# Patient Record
Sex: FEMALE | Race: WHITE | Employment: FULL TIME | URBAN - METROPOLITAN AREA
[De-identification: names, ages, dates, MRNs, and addresses within clinical notes are randomized per-mention and may not be internally consistent; named-entity substitution may affect disease eponyms.]

---

## 2024-03-11 ENCOUNTER — OCCMED (OUTPATIENT)
Dept: URGENT CARE | Facility: CLINIC | Age: 44
End: 2024-03-11

## 2024-03-11 ENCOUNTER — APPOINTMENT (OUTPATIENT)
Dept: LAB | Facility: CLINIC | Age: 44
End: 2024-03-11

## 2024-03-11 DIAGNOSIS — Z02.1 PHYSICAL EXAM, PRE-EMPLOYMENT: Primary | ICD-10-CM

## 2024-03-11 DIAGNOSIS — Z02.1 PHYSICAL EXAM, PRE-EMPLOYMENT: ICD-10-CM

## 2024-03-11 LAB — RUBV IGG SERPL IA-ACNC: 24.1 IU/ML

## 2024-03-11 PROCEDURE — 86735 MUMPS ANTIBODY: CPT

## 2024-03-11 PROCEDURE — 86762 RUBELLA ANTIBODY: CPT

## 2024-03-11 PROCEDURE — 86765 RUBEOLA ANTIBODY: CPT

## 2024-03-11 PROCEDURE — 86787 VARICELLA-ZOSTER ANTIBODY: CPT

## 2024-03-11 PROCEDURE — 36415 COLL VENOUS BLD VENIPUNCTURE: CPT

## 2024-03-11 PROCEDURE — 86480 TB TEST CELL IMMUN MEASURE: CPT

## 2024-03-12 LAB
GAMMA INTERFERON BACKGROUND BLD IA-ACNC: 0.19 IU/ML
M TB IFN-G BLD-IMP: NEGATIVE
M TB IFN-G CD4+ BCKGRND COR BLD-ACNC: 0.01 IU/ML
M TB IFN-G CD4+ BCKGRND COR BLD-ACNC: 0.13 IU/ML
MEV IGG SER QL IA: NORMAL
MITOGEN IGNF BCKGRD COR BLD-ACNC: 9.81 IU/ML
MUV IGG SER QL IA: NORMAL
VZV IGG SER QL IA: NORMAL

## 2024-03-28 ENCOUNTER — OFFICE VISIT (OUTPATIENT)
Dept: URGENT CARE | Age: 44
End: 2024-03-28

## 2024-03-28 VITALS — RESPIRATION RATE: 18 BRPM | TEMPERATURE: 99.5 F | OXYGEN SATURATION: 99 % | HEART RATE: 85 BPM

## 2024-03-28 DIAGNOSIS — J06.9 ACUTE URI: Primary | ICD-10-CM

## 2024-03-28 PROCEDURE — 99213 OFFICE O/P EST LOW 20 MIN: CPT

## 2024-03-28 RX ORDER — BENZONATATE 200 MG/1
200 CAPSULE ORAL 3 TIMES DAILY PRN
Qty: 20 CAPSULE | Refills: 0 | Status: SHIPPED | OUTPATIENT
Start: 2024-03-28 | End: 2024-03-29

## 2024-03-28 NOTE — PROGRESS NOTES
"  [facility]        NAME: Saloni Malone is a 43 y.o. female  : 1980    MRN: [id number]  DATE: 2024  TIME: 6:35 PM    Assessment and Plan   Acute URI [J06.9]  1. Acute URI  benzonatate (TESSALON) 200 MG capsule        Presents with significant other. Cough, congestion, rhinorrhea. Eating/drinking WNL. Playing on phone during visit in NAD. BS clear. Has \" tried everything\".     Patient Instructions       Follow up with PCP as needed    If tests have been performed at [facility], our office will contact you with results if changes need to be made to the care plan discussed with you at the visit.  [facility]    Chief Complaint     Chief Complaint   Patient presents with    Earache     Cough starting 3 days ago. Some pain in right ear         History of Present Illness       Presents with significant other. Cough, congestion, rhinorrhea. Eating/drinking WNL. Playing on phone during visit in NAD. BS clear. Has \" tried everything\".     Earache   Associated symptoms include coughing and rhinorrhea.       Review of Systems   Review of Systems   Constitutional:  Negative for fever.   HENT:  Positive for congestion, ear pain and rhinorrhea.    Respiratory:  Positive for cough. Negative for shortness of breath and wheezing.    All other systems reviewed and are negative.        Current Medications       Current Outpatient Medications:     benzonatate (TESSALON) 200 MG capsule, Take 1 capsule (200 mg total) by mouth 3 (three) times a day as needed for cough, Disp: 20 capsule, Rfl: 0    Current Allergies     Allergies as of 2024    (No Known Allergies)            The following portions of the patient's history were reviewed and updated as appropriate: allergies, current medications, past family history, past medical history, past social history, past surgical history and problem list.     No past medical history on file.    No past surgical history " on file.    No family history on file.      Medications have been verified.        Objective   Pulse 85   Temp 99.5 °F (37.5 °C)   Resp 18   SpO2 99%   No LMP recorded.       Physical Exam     Physical Exam  Vitals reviewed.   Constitutional:       Appearance: Normal appearance.   HENT:      Right Ear: Tympanic membrane normal.      Left Ear: Tympanic membrane normal.      Nose: Congestion and rhinorrhea present.   Cardiovascular:      Rate and Rhythm: Normal rate and regular rhythm.      Pulses: Normal pulses.      Heart sounds: Normal heart sounds.   Pulmonary:      Effort: Pulmonary effort is normal.      Breath sounds: Normal breath sounds.   Musculoskeletal:         General: Normal range of motion.   Lymphadenopathy:      Cervical: No cervical adenopathy.   Skin:     General: Skin is warm and dry.   Neurological:      Mental Status: She is alert.

## 2024-03-29 DIAGNOSIS — R05.1 ACUTE COUGH: Primary | ICD-10-CM

## 2024-03-29 RX ORDER — BENZONATATE 200 MG/1
200 CAPSULE ORAL 3 TIMES DAILY PRN
Qty: 20 CAPSULE | Refills: 0 | Status: SHIPPED | OUTPATIENT
Start: 2024-03-29

## 2024-04-03 ENCOUNTER — HOSPITAL ENCOUNTER (EMERGENCY)
Facility: HOSPITAL | Age: 44
Discharge: HOME | End: 2024-04-03
Attending: EMERGENCY MEDICINE
Payer: COMMERCIAL

## 2024-04-03 VITALS
OXYGEN SATURATION: 98 % | HEART RATE: 105 BPM | RESPIRATION RATE: 16 BRPM | WEIGHT: 194 LBS | SYSTOLIC BLOOD PRESSURE: 143 MMHG | TEMPERATURE: 98.5 F | DIASTOLIC BLOOD PRESSURE: 94 MMHG

## 2024-04-03 DIAGNOSIS — J02.9 PHARYNGITIS: Primary | ICD-10-CM

## 2024-04-03 LAB
FLUAV RNA RESP QL NAA+PROBE: NEGATIVE
FLUBV RNA RESP QL NAA+PROBE: NEGATIVE
RSV RNA RESP QL NAA+PROBE: NEGATIVE
S PYO DNA THROAT QL NAA+PROBE: NOT DETECTED
SARS-COV-2 RNA RESP QL NAA+PROBE: NEGATIVE

## 2024-04-03 PROCEDURE — 87651 STREP A DNA AMP PROBE: CPT | Performed by: EMERGENCY MEDICINE

## 2024-04-03 PROCEDURE — 99284 EMERGENCY DEPT VISIT MOD MDM: CPT | Performed by: EMERGENCY MEDICINE

## 2024-04-03 PROCEDURE — 99283 EMERGENCY DEPT VISIT LOW MDM: CPT

## 2024-04-03 PROCEDURE — 0241U HB NFCT DS VIR RESP RNA 4 TRGT: CPT | Performed by: EMERGENCY MEDICINE

## 2024-04-03 RX ORDER — BENZOCAINE AND MENTHOL 15; 3.6 MG/1; MG/1
1 LOZENGE ORAL EVERY 2 HOUR PRN
Qty: 32 LOZENGE | Refills: 0 | Status: SHIPPED | OUTPATIENT
Start: 2024-04-03

## 2024-04-03 RX ORDER — ALBUTEROL SULFATE 90 UG/1
2 AEROSOL, METERED RESPIRATORY (INHALATION) ONCE
Status: COMPLETED | OUTPATIENT
Start: 2024-04-03 | End: 2024-04-03

## 2024-04-03 RX ORDER — AMOXICILLIN AND CLAVULANATE POTASSIUM 875; 125 MG/1; MG/1
1 TABLET, FILM COATED ORAL EVERY 12 HOURS
Qty: 14 TABLET | Refills: 0 | Status: SHIPPED | OUTPATIENT
Start: 2024-04-03 | End: 2024-04-10

## 2024-04-03 RX ADMIN — DEXAMETHASONE SODIUM PHOSPHATE 10 MG: 10 INJECTION, SOLUTION INTRAMUSCULAR; INTRAVENOUS at 12:18

## 2024-04-03 RX ADMIN — ALBUTEROL SULFATE 2 PUFF: 90 AEROSOL, METERED RESPIRATORY (INHALATION) at 12:19

## 2024-04-03 NOTE — Clinical Note
Saloni Malone was seen and treated in our emergency department on 4/3/2024.                Diagnosis:     Saloni  may return to work on return date.    She may return on this date: 04/05/2024         If you have any questions or concerns, please don't hesitate to call.      Lopez Rios MD    ______________________________           _______________          _______________  Hospital Representative                              Date                                Time

## 2024-04-12 NOTE — ED PROVIDER NOTES
History  Chief Complaint   Patient presents with    Flu Symptoms     Pt reports sore throat, cough, congestion with decrease in appetite due to throat hurting. Seen at urgent care and given medicine for cough with no relief     Saloni is a very pleasant 43-year-old female here for evaluation of sore throat, nasal congestion, decreased appetite and general malaise for the past 5 days or so.  She was seen at urgent care on 3/28/2024, diagnosed with viral URI and prescribed Tessalon Perles.  He said provided her minimal relief.  Her biggest complaint today is sore throat and painful swallowing.  She is able to tolerate liquids p.o. but swallowing is painful.  No difficulty handling her own secretions.      Flu Symptoms  Presenting symptoms: fatigue, myalgias, rhinorrhea and sore throat    Presenting symptoms: no cough, no fever, no nausea, no shortness of breath and no vomiting    Associated symptoms: nasal congestion    Associated symptoms: no chills and no ear pain        Prior to Admission Medications   Prescriptions Last Dose Informant Patient Reported? Taking?   benzonatate (TESSALON) 200 MG capsule   No No   Sig: Take 1 capsule (200 mg total) by mouth 3 (three) times a day as needed for cough      Facility-Administered Medications: None       History reviewed. No pertinent past medical history.    History reviewed. No pertinent surgical history.    History reviewed. No pertinent family history.  I have reviewed and agree with the history as documented.    E-Cigarette/Vaping    E-Cigarette Use Never User      E-Cigarette/Vaping Substances    Nicotine No     THC No     CBD No     Flavoring No     Other No     Unknown No      Social History     Tobacco Use    Smoking status: Never    Smokeless tobacco: Never   Vaping Use    Vaping status: Never Used   Substance Use Topics    Alcohol use: Not Currently    Drug use: Not Currently       Review of Systems   Constitutional:  Positive for fatigue. Negative for chills and  fever.   HENT:  Positive for congestion, rhinorrhea and sore throat. Negative for ear pain.    Eyes:  Negative for visual disturbance.   Respiratory:  Negative for cough and shortness of breath.    Cardiovascular:  Negative for chest pain and palpitations.   Gastrointestinal:  Negative for abdominal pain, nausea and vomiting.   Genitourinary:  Negative for dysuria and hematuria.   Musculoskeletal:  Positive for myalgias. Negative for arthralgias and back pain.   Skin:  Negative for color change and rash.   Neurological:  Negative for syncope.   All other systems reviewed and are negative.      Physical Exam  Physical Exam  Vitals and nursing note reviewed.   Constitutional:       General: She is not in acute distress.     Appearance: She is well-developed.   HENT:      Head: Normocephalic and atraumatic.      Mouth/Throat:      Mouth: Mucous membranes are moist.      Pharynx: Posterior oropharyngeal erythema present. No oropharyngeal exudate.      Comments: Pharyngeal erythema without exudates.  Uvula is midline.  No significant trismus.  No clinical evidence of PTA or RPA.  Sublingual area is unremarkable.  Eyes:      Conjunctiva/sclera: Conjunctivae normal.   Cardiovascular:      Rate and Rhythm: Normal rate and regular rhythm.      Heart sounds: No murmur heard.  Pulmonary:      Effort: Pulmonary effort is normal. No respiratory distress.      Breath sounds: Normal breath sounds.   Abdominal:      Palpations: Abdomen is soft.      Tenderness: There is no abdominal tenderness.   Musculoskeletal:         General: No swelling.      Cervical back: Neck supple.   Skin:     General: Skin is warm and dry.      Capillary Refill: Capillary refill takes less than 2 seconds.      Findings: No rash.   Neurological:      General: No focal deficit present.      Mental Status: She is alert and oriented to person, place, and time.   Psychiatric:         Mood and Affect: Mood normal.         Vital Signs  ED Triage Vitals  [04/03/24 1153]   Temperature Pulse Respirations Blood Pressure SpO2   98.5 °F (36.9 °C) 105 16 143/94 98 %      Temp Source Heart Rate Source Patient Position - Orthostatic VS BP Location FiO2 (%)   Oral Monitor Sitting Right arm --      Pain Score       7           Vitals:    04/03/24 1153   BP: 143/94   Pulse: 105   Patient Position - Orthostatic VS: Sitting         Visual Acuity      ED Medications  Medications   dexamethasone oral liquid 10 mg 1 mL (10 mg Oral Given 4/3/24 1218)   albuterol (PROVENTIL HFA,VENTOLIN HFA) inhaler 2 puff (2 puffs Inhalation Given 4/3/24 1219)       Diagnostic Studies  Results Reviewed       Procedure Component Value Units Date/Time    FLU/RSV/COVID - if FLU/RSV clinically relevant [[id number]]  (Normal) Collected: 04/03/24 1217    Lab Status: Final result Specimen: Nares from Nose Updated: 04/03/24 1329     SARS-CoV-2 Negative     INFLUENZA A PCR Negative     INFLUENZA B PCR Negative     RSV PCR Negative    Narrative:      FOR PEDIATRIC PATIENTS - copy/paste COVID Guidelines URL to browser: [email or web address]    SARS-CoV-2 assay is a Nucleic Acid Amplification assay intended for the  qualitative detection of nucleic acid from SARS-CoV-2 in nasopharyngeal  swabs. Results are for the presumptive identification of SARS-CoV-2 RNA.    Positive results are indicative of infection with SARS-CoV-2, the virus  causing COVID-19, but do not rule out bacterial infection or co-infection  with other viruses. Laboratories within the [place] and its  territories are required to report all positive results to the appropriate  public health authorities. Negative results do not preclude SARS-CoV-2  infection and should not be used as the sole basis for treatment or other  patient management decisions. Negative results must be combined with  clinical observations, patient history, and epidemiological information.  This test has not been FDA  cleared or approved.    This test has been authorized by FDA under an Emergency Use Authorization  (EUA). This test is only authorized for the duration of time the  declaration that circumstances exist justifying the authorization of the  emergency use of an in vitro diagnostic tests for detection of SARS-CoV-2  virus and/or diagnosis of COVID-19 infection under section 564(b)(1) of  the Act, 21 U.S.C. 360bbb-3(b)(1), unless the authorization is terminated  or revoked sooner. The test has been validated but independent review by FDA  and CLIA is pending.    Test performed using [employer] GeneXpert: This RT-PCR assay targets N2,  a region unique to SARS-CoV-2. A conserved region in the E-gene was chosen  for pan-Sarbecovirus detection which includes SARS-CoV-2.    According to CMS-2020-01-R, this platform meets the definition of high-throughput technology.    Strep A PCR [[id number]]  (Normal) Collected: 04/03/24 1217    Lab Status: Final result Specimen: Throat Updated: 04/03/24 1315     STREP A PCR Not Detected                   No orders to display              Procedures  Procedures         ED Course                               SBIRT 20yo+      Flowsheet Row Most Recent Value   Initial Alcohol Screen: US AUDIT-C     1. How often do you have a drink containing alcohol? 0 Filed at: 04/03/2024 1212   2. How many drinks containing alcohol do you have on a typical day you are drinking?  0 Filed at: 04/03/2024 1212   3b. FEMALE Any Age, or MALE 65+: How often do you have 4 or more drinks on one occassion? 0 Filed at: 04/03/2024 1212   Audit-C Score 0 Filed at: 04/03/2024 1212   ASPEN: How many times in the past year have you...    Used an illegal drug or used a prescription medication for non-medical reasons? Never Filed at: 04/03/2024 1212                      Medical Decision Making  Amount and/or Complexity of Data Reviewed  Labs: ordered. Decision-making details documented in ED Course.    Risk  OTC  drugs.  Prescription drug management.             Disposition  Final diagnoses:   Pharyngitis     Time reflects when diagnosis was documented in both MDM as applicable and the Disposition within this note       Time User Action Codes Description Comment    4/3/2024 12:13 PM Lopez Rios Add [J02.9] Pharyngitis           ED Disposition       ED Disposition   Discharge    Condition   Stable    Date/Time   Wed Apr 3, 2024 1212    Comment   Saloni Malone discharge to home/self care.                   Follow-up Information       Follow up With Specialties Details Why Contact [facility] 2nd Floor Family Medicine   [address] [facility] 2ND FLOOR  [facility] [address]  [phone]              Discharge Medication List as of 4/3/2024 12:15 PM        START taking these medications    Details   amoxicillin-clavulanate (AUGMENTIN) 875-125 mg per tablet Take 1 tablet by mouth every 12 (twelve) hours for 7 days, Starting Wed 4/3/2024, Until Wed 4/10/2024, Normal      benzocaine-menthol (Cepacol Sore Throat Ex St) 15-3.6 mg per lozenge Apply 1 lozenge to the mouth or throat every 2 (two) hours as needed (sore throat), Starting Wed 4/3/2024, Normal           CONTINUE these medications which have NOT CHANGED    Details   benzonatate (TESSALON) 200 MG capsule Take 1 capsule (200 mg total) by mouth 3 (three) times a day as needed for cough, Starting Fri 3/29/2024, Print             No discharge procedures on file.    PDMP Review       None            ED Provider  Electronically Signed by             Lopez Rios MD  04/12/24 1435

## 2024-05-15 ENCOUNTER — OFFICE VISIT (OUTPATIENT)
Dept: FAMILY MEDICINE CLINIC | Facility: CLINIC | Age: 44
End: 2024-05-15
Payer: COMMERCIAL

## 2024-05-15 ENCOUNTER — HOSPITAL ENCOUNTER (OUTPATIENT)
Dept: RADIOLOGY | Facility: HOSPITAL | Age: 44
Discharge: HOME | End: 2024-05-15
Payer: COMMERCIAL

## 2024-05-15 VITALS
SYSTOLIC BLOOD PRESSURE: 104 MMHG | DIASTOLIC BLOOD PRESSURE: 80 MMHG | HEIGHT: 65 IN | WEIGHT: 189 LBS | BODY MASS INDEX: 31.49 KG/M2 | HEART RATE: 84 BPM

## 2024-05-15 DIAGNOSIS — Z12.31 ENCOUNTER FOR SCREENING MAMMOGRAM FOR MALIGNANT NEOPLASM OF BREAST: ICD-10-CM

## 2024-05-15 DIAGNOSIS — Z13.220 LIPID SCREENING: ICD-10-CM

## 2024-05-15 DIAGNOSIS — Z11.59 NEED FOR HEPATITIS C SCREENING TEST: ICD-10-CM

## 2024-05-15 DIAGNOSIS — Z11.4 SCREENING FOR HIV (HUMAN IMMUNODEFICIENCY VIRUS): ICD-10-CM

## 2024-05-15 DIAGNOSIS — Z00.00 HEALTHCARE MAINTENANCE: ICD-10-CM

## 2024-05-15 DIAGNOSIS — R05.3 CHRONIC COUGH: ICD-10-CM

## 2024-05-15 DIAGNOSIS — Z12.11 SCREEN FOR COLON CANCER: ICD-10-CM

## 2024-05-15 DIAGNOSIS — R63.5 WEIGHT GAIN: Primary | ICD-10-CM

## 2024-05-15 PROBLEM — K76.0 HEPATIC STEATOSIS: Status: ACTIVE | Noted: 2019-07-15

## 2024-05-15 PROBLEM — Z86.018 HISTORY OF UTERINE FIBROID: Status: ACTIVE | Noted: 2019-07-15

## 2024-05-15 PROCEDURE — 99204 OFFICE O/P NEW MOD 45 MIN: CPT | Performed by: PHYSICIAN ASSISTANT

## 2024-05-15 PROCEDURE — 71046 X-RAY EXAM CHEST 2 VIEWS: CPT

## 2024-05-15 RX ORDER — PREDNISONE 10 MG/1
TABLET ORAL
Qty: 25 TABLET | Refills: 0 | Status: SHIPPED | OUTPATIENT
Start: 2024-05-15

## 2024-05-15 NOTE — PROGRESS NOTES
Ambulatory Visit  Name: Saloni Malone      : 1980      MRN: [id number]  Encounter Provider: Sylwia Cates PA-C  Encounter Date: 5/15/2024   Encounter department: [facility]    Assessment & Plan   1. Weight gain  Assessment & Plan:  Check fasting labs including TSH.  Orders:  -     CBC and differential  -     Comprehensive metabolic panel  -     TSH, 3rd generation with Free T4 reflex  2. Healthcare maintenance  -     Ambulatory Referral to Obstetrics / Gynecology; Future  3. Lipid screening  -     Lipid panel  4. Chronic cough  Assessment & Plan:  I do believe patient is dealing with severe postinfectious cough.  Her work entails her to talk on the phone a lot and she is having trouble continuing her job in a professional manner.  Check chest x-ray.  Trial prednisone taper.Denies heartburn or history of asthma or allergies but will need to look into these things if symptoms do not subside with this treatment plan.  Orders:  -     XR chest pa & lateral; Future; Expected date: 05/15/2024  -     predniSONE 10 mg tablet; Day 1= 50 mg at once, date 2+3= 40 mg once daily, day 4+5= 30 mg once daily, day 6+7= 20 mg once daily, and day 8+9= 10 mg once daily.  5. Encounter for screening mammogram for malignant neoplasm of breast  -     Mammo screening bilateral w 3d & cad; Future  6. Screen for colon cancer  -     Ambulatory Referral to Gastroenterology; Future  7. Need for hepatitis C screening test  -     Hepatitis C Antibody; Future  8. Screening for HIV (human immunodeficiency virus)  -     HIV 1/2 AG/AB w Reflex [facility] for 2 yr old and above; Future      Depression Screening and Follow-up Plan: Patient was screened for depression during today's encounter. They screened negative with a PHQ-2 score of 1.      History of Present Illness     Patient presents with: [employer] employee insurance.  Establish Care  Cough: Has a cough since March since was sick end of March. NO seasonal  "allergies. This has happened about 3 years ago and was told to take allergy med and it worked but not this time. Is better but still having episodes of coughing. NO heartburn. At times productive but does not look at color.   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  He lost insurance and their family is in need of routine care follow-up dental appointments.  She has one 16-year-old who is graduating high school from [employer] and also twin [id number],  girl and boy.        Review of Systems   Constitutional: Negative.    HENT: Negative.     Eyes: Negative.    Respiratory: Negative.     Cardiovascular: Negative.    Gastrointestinal: Negative.    Endocrine: Negative.    Genitourinary: Negative.    Musculoskeletal: Negative.    Skin: Negative.    Allergic/Immunologic: Negative.    Neurological: Negative.    Hematological: Negative.    Psychiatric/Behavioral: Negative.         Objective     /80   Pulse 84   Ht 5' 4.5\" (1.638 m)   Wt 85.7 kg (189 lb)   BMI 31.94 kg/m²     Physical Exam  Vitals and nursing note reviewed.   Constitutional:       Appearance: Normal appearance. She is well-developed.   HENT:      Head: Normocephalic and atraumatic.   Eyes:      General: Lids are normal.      Conjunctiva/sclera: Conjunctivae normal.      Pupils: Pupils are equal, round, and reactive to light.   Cardiovascular:      Rate and Rhythm: Normal rate and regular rhythm.      Heart sounds: No murmur heard.  Pulmonary:      Effort: Pulmonary effort is normal.      Breath sounds: Normal breath sounds.   Skin:     General: Skin is warm and dry.   Neurological:      General: No focal deficit present.      Mental Status: She is alert.      Coordination: Coordination is intact.   Psychiatric:         Mood and Affect: Mood normal.         Behavior: Behavior normal. Behavior is cooperative.         Thought Content: Thought content " normal.         Judgment: Judgment normal.       Administrative Statements

## 2024-05-15 NOTE — PATIENT INSTRUCTIONS
1. Weight gain  Assessment & Plan:  Check fasting labs including TSH.  Orders:  -     CBC and differential  -     Comprehensive metabolic panel  -     TSH, 3rd generation with Free T4 reflex  2. Healthcare maintenance  -     Ambulatory Referral to Obstetrics / Gynecology; Future  3. Lipid screening  -     Lipid panel  4. Chronic cough  Assessment & Plan:  I do believe patient is dealing with severe postinfectious cough.  Her work entails her to talk on the phone a lot and she is having trouble continuing her job in a professional manner.  Check chest x-ray.  Trial prednisone taper.Denies heartburn or history of asthma or allergies but will need to look into these things if symptoms do not subside with this treatment plan.  Orders:  -     XR chest pa & lateral; Future; Expected date: 05/15/2024  -     predniSONE 10 mg tablet; Day 1= 50 mg at once, date 2+3= 40 mg once daily, day 4+5= 30 mg once daily, day 6+7= 20 mg once daily, and day 8+9= 10 mg once daily.  5. Encounter for screening mammogram for malignant neoplasm of breast  -     Mammo screening bilateral w 3d & cad; Future  6. Screen for colon cancer  -     Ambulatory Referral to Gastroenterology; Future  7. Need for hepatitis C screening test  -     Hepatitis C Antibody; Future  8. Screening for HIV (human immunodeficiency virus)  -     HIV 1/2 AG/AB w Reflex [facility] for 2 yr old and above; Future

## 2024-05-15 NOTE — ASSESSMENT & PLAN NOTE
I do believe patient is dealing with severe postinfectious cough.  Her work entails her to talk on the phone a lot and she is having trouble continuing her job in a professional manner.  Check chest x-ray.  Trial prednisone taper.Denies heartburn or history of asthma or allergies but will need to look into these things if symptoms do not subside with this treatment plan.

## 2024-05-16 ENCOUNTER — TELEPHONE (OUTPATIENT)
Age: 44
End: 2024-05-16

## 2024-05-16 ENCOUNTER — LAB (OUTPATIENT)
Dept: LAB | Facility: CLINIC | Age: 44
End: 2024-05-16
Payer: COMMERCIAL

## 2024-05-16 DIAGNOSIS — R73.9 HYPERGLYCEMIA: ICD-10-CM

## 2024-05-16 DIAGNOSIS — Z11.4 SCREENING FOR HIV (HUMAN IMMUNODEFICIENCY VIRUS): ICD-10-CM

## 2024-05-16 DIAGNOSIS — Z11.59 NEED FOR HEPATITIS C SCREENING TEST: ICD-10-CM

## 2024-05-16 DIAGNOSIS — R73.9 HYPERGLYCEMIA: Primary | ICD-10-CM

## 2024-05-16 LAB
ALBUMIN SERPL BCP-MCNC: 4.2 G/DL (ref 3.5–5)
ALP SERPL-CCNC: 93 U/L (ref 34–104)
ALT SERPL W P-5'-P-CCNC: 37 U/L (ref 7–52)
ANION GAP SERPL CALCULATED.3IONS-SCNC: 9 MMOL/L (ref 4–13)
AST SERPL W P-5'-P-CCNC: 20 U/L (ref 13–39)
BASOPHILS # BLD AUTO: 0.01 THOUSANDS/ÂΜL (ref 0–0.1)
BASOPHILS NFR BLD AUTO: 0 % (ref 0–1)
BILIRUB SERPL-MCNC: 0.58 MG/DL (ref 0.2–1)
BUN SERPL-MCNC: 16 MG/DL (ref 5–25)
CALCIUM SERPL-MCNC: 9.2 MG/DL (ref 8.4–10.2)
CHLORIDE SERPL-SCNC: 101 MMOL/L (ref 96–108)
CHOLEST SERPL-MCNC: 216 MG/DL
CO2 SERPL-SCNC: 24 MMOL/L (ref 21–32)
CREAT SERPL-MCNC: 0.57 MG/DL (ref 0.6–1.3)
EOSINOPHIL # BLD AUTO: 0 THOUSAND/ÂΜL (ref 0–0.61)
EOSINOPHIL NFR BLD AUTO: 0 % (ref 0–6)
ERYTHROCYTE [DISTWIDTH] IN BLOOD BY AUTOMATED COUNT: 12.4 % (ref 11.6–15.1)
GFR SERPL CREATININE-BSD FRML MDRD: 112 ML/MIN/1.73SQ M
GLUCOSE P FAST SERPL-MCNC: 259 MG/DL (ref 65–99)
HCT VFR BLD AUTO: 40 % (ref 34.8–46.1)
HCV AB SER QL: NORMAL
HDLC SERPL-MCNC: 54 MG/DL
HGB BLD-MCNC: 13.2 G/DL (ref 11.5–15.4)
HIV 1+2 AB+HIV1 P24 AG SERPL QL IA: NORMAL
HIV 2 AB SERPL QL IA: NORMAL
HIV1 AB SERPL QL IA: NORMAL
HIV1 P24 AG SERPL QL IA: NORMAL
IMM GRANULOCYTES # BLD AUTO: 0.04 THOUSAND/UL (ref 0–0.2)
IMM GRANULOCYTES NFR BLD AUTO: 0 % (ref 0–2)
LDLC SERPL CALC-MCNC: 142 MG/DL (ref 0–100)
LYMPHOCYTES # BLD AUTO: 1.93 THOUSANDS/ÂΜL (ref 0.6–4.47)
LYMPHOCYTES NFR BLD AUTO: 15 % (ref 14–44)
MCH RBC QN AUTO: 28.4 PG (ref 26.8–34.3)
MCHC RBC AUTO-ENTMCNC: 33 G/DL (ref 31.4–37.4)
MCV RBC AUTO: 86 FL (ref 82–98)
MONOCYTES # BLD AUTO: 0.46 THOUSAND/ÂΜL (ref 0.17–1.22)
MONOCYTES NFR BLD AUTO: 4 % (ref 4–12)
NEUTROPHILS # BLD AUTO: 10.76 THOUSANDS/ÂΜL (ref 1.85–7.62)
NEUTS SEG NFR BLD AUTO: 81 % (ref 43–75)
NONHDLC SERPL-MCNC: 162 MG/DL
NRBC BLD AUTO-RTO: 0 /100 WBCS
PLATELET # BLD AUTO: 270 THOUSANDS/UL (ref 149–390)
PMV BLD AUTO: 12.2 FL (ref 8.9–12.7)
POTASSIUM SERPL-SCNC: 4.2 MMOL/L (ref 3.5–5.3)
PROT SERPL-MCNC: 8 G/DL (ref 6.4–8.4)
RBC # BLD AUTO: 4.65 MILLION/UL (ref 3.81–5.12)
SODIUM SERPL-SCNC: 134 MMOL/L (ref 135–147)
TRIGL SERPL-MCNC: 99 MG/DL
TSH SERPL DL<=0.05 MIU/L-ACNC: 0.86 UIU/ML (ref 0.45–4.5)
WBC # BLD AUTO: 13.2 THOUSAND/UL (ref 4.31–10.16)

## 2024-05-16 PROCEDURE — 80061 LIPID PANEL: CPT | Performed by: PHYSICIAN ASSISTANT

## 2024-05-16 PROCEDURE — 85025 COMPLETE CBC W/AUTO DIFF WBC: CPT | Performed by: PHYSICIAN ASSISTANT

## 2024-05-16 PROCEDURE — 36415 COLL VENOUS BLD VENIPUNCTURE: CPT

## 2024-05-16 PROCEDURE — 83036 HEMOGLOBIN GLYCOSYLATED A1C: CPT

## 2024-05-16 PROCEDURE — 84443 ASSAY THYROID STIM HORMONE: CPT | Performed by: PHYSICIAN ASSISTANT

## 2024-05-16 PROCEDURE — 87389 HIV-1 AG W/HIV-1&-2 AB AG IA: CPT

## 2024-05-16 PROCEDURE — 86803 HEPATITIS C AB TEST: CPT

## 2024-05-16 PROCEDURE — 80053 COMPREHEN METABOLIC PANEL: CPT | Performed by: PHYSICIAN ASSISTANT

## 2024-05-16 NOTE — RESULT ENCOUNTER NOTE
Can we please call lab and see if they can add on an A1C due to fasting glucose of 259? Order was placed. Thank you.

## 2024-05-16 NOTE — TELEPHONE ENCOUNTER
Patient called asking to review her lab results from today. Please have provider review results and f/u with patient.    Patient said that you may leave a detailed voice mail if she doesn't answer

## 2024-05-16 NOTE — TELEPHONE ENCOUNTER
Left detailed message, results are not reviewed yet since Sylwia ordered additional blood work to her labs due to elevated fasting glucose.  We will call patient as soon as we get all results back.

## 2024-05-17 ENCOUNTER — TELEPHONE (OUTPATIENT)
Dept: FAMILY MEDICINE CLINIC | Facility: CLINIC | Age: 44
End: 2024-05-17

## 2024-05-17 LAB
EST. AVERAGE GLUCOSE BLD GHB EST-MCNC: 206 MG/DL
HBA1C MFR BLD: 8.8 %

## 2024-05-17 NOTE — RESULT ENCOUNTER NOTE
Please let pt know that her labs show she is diabetic with a fasting glucose of  259 and an A1C of 8.8. I would like her to se richardp an apt with me to review this new diagnosis and discuss plan for management.   Chest xray was normal.   Thank you.

## 2024-05-17 NOTE — TELEPHONE ENCOUNTER
Maribel Cates;    Please let pt know that her labs show she is diabetic with a fasting glucose of  259 and an A1C of 8.8. I would like her to se richardp an apt with me to review this new diagnosis and discuss plan for management.  Chest xray was normal.  Thank you.

## 2024-05-20 NOTE — TELEPHONE ENCOUNTER
Patient aware of labs. Scheduled for next Tuesday 5/28. Asking what she should do or change in the meantime.

## 2024-05-20 NOTE — TELEPHONE ENCOUNTER
The only thing she can do is to stay away from sweets, candy desserts and overeating simple carbs.

## 2024-05-22 ENCOUNTER — OFFICE VISIT (OUTPATIENT)
Dept: FAMILY MEDICINE CLINIC | Facility: CLINIC | Age: 44
End: 2024-05-22
Payer: COMMERCIAL

## 2024-05-22 ENCOUNTER — TELEPHONE (OUTPATIENT)
Age: 44
End: 2024-05-22

## 2024-05-22 VITALS
DIASTOLIC BLOOD PRESSURE: 82 MMHG | BODY MASS INDEX: 30.32 KG/M2 | SYSTOLIC BLOOD PRESSURE: 120 MMHG | HEIGHT: 65 IN | OXYGEN SATURATION: 98 % | HEART RATE: 66 BPM | WEIGHT: 182 LBS

## 2024-05-22 DIAGNOSIS — E11.65 TYPE 2 DIABETES MELLITUS WITH HYPERGLYCEMIA, WITHOUT LONG-TERM CURRENT USE OF INSULIN (HCC): Primary | ICD-10-CM

## 2024-05-22 DIAGNOSIS — R42 DIZZINESS: ICD-10-CM

## 2024-05-22 LAB
CREAT UR-MCNC: 41.4 MG/DL
LEFT EYE DIABETIC RETINOPATHY: NORMAL
LEFT EYE IMAGE QUALITY: NORMAL
LEFT EYE MACULAR EDEMA: NORMAL
LEFT EYE OTHER RETINOPATHY: NORMAL
MICROALBUMIN UR-MCNC: <7 MG/L
MICROALBUMIN/CREAT 24H UR: <17 MG/G CREATININE (ref 0–30)
RIGHT EYE DIABETIC RETINOPATHY: NORMAL
RIGHT EYE IMAGE QUALITY: NORMAL
RIGHT EYE MACULAR EDEMA: NORMAL
RIGHT EYE OTHER RETINOPATHY: NORMAL
SEVERITY (EYE EXAM): NORMAL
SL AMB  POCT GLUCOSE, UA: NORMAL
SL AMB LEUKOCYTE ESTERASE,UA: NEGATIVE
SL AMB POCT BILIRUBIN,UA: NEGATIVE
SL AMB POCT BLOOD,UA: NORMAL
SL AMB POCT CLARITY,UA: CLEAR
SL AMB POCT COLOR,UA: YELLOW
SL AMB POCT GLUCOSE BLD: 216
SL AMB POCT KETONES,UA: NEGATIVE
SL AMB POCT NITRITE,UA: POSITIVE
SL AMB POCT PH,UA: 5.5
SL AMB POCT SPECIFIC GRAVITY,UA: 1.01
SL AMB POCT URINE PROTEIN: NEGATIVE
SL AMB POCT UROBILINOGEN: 0.2

## 2024-05-22 PROCEDURE — 82948 REAGENT STRIP/BLOOD GLUCOSE: CPT | Performed by: NURSE PRACTITIONER

## 2024-05-22 PROCEDURE — 82043 UR ALBUMIN QUANTITATIVE: CPT | Performed by: NURSE PRACTITIONER

## 2024-05-22 PROCEDURE — 92250 FUNDUS PHOTOGRAPHY W/I&R: CPT | Performed by: NURSE PRACTITIONER

## 2024-05-22 PROCEDURE — 82570 ASSAY OF URINE CREATININE: CPT | Performed by: NURSE PRACTITIONER

## 2024-05-22 PROCEDURE — 81002 URINALYSIS NONAUTO W/O SCOPE: CPT | Performed by: NURSE PRACTITIONER

## 2024-05-22 PROCEDURE — 99214 OFFICE O/P EST MOD 30 MIN: CPT | Performed by: NURSE PRACTITIONER

## 2024-05-22 RX ORDER — BLOOD SUGAR DIAGNOSTIC
STRIP MISCELLANEOUS
Qty: 100 EACH | Refills: 3 | Status: SHIPPED | OUTPATIENT
Start: 2024-05-22

## 2024-05-22 RX ORDER — MECLIZINE HYDROCHLORIDE 25 MG/1
25 TABLET ORAL 3 TIMES DAILY PRN
Qty: 30 TABLET | Refills: 0 | Status: SHIPPED | OUTPATIENT
Start: 2024-05-22

## 2024-05-22 RX ORDER — METFORMIN HYDROCHLORIDE 500 MG/1
500 TABLET, EXTENDED RELEASE ORAL
Qty: 90 TABLET | Refills: 3 | Status: SHIPPED | OUTPATIENT
Start: 2024-05-22

## 2024-05-22 RX ORDER — ATORVASTATIN CALCIUM 20 MG/1
20 TABLET, FILM COATED ORAL EVERY EVENING
Qty: 100 TABLET | Refills: 1 | Status: SHIPPED | OUTPATIENT
Start: 2024-05-22

## 2024-05-22 RX ORDER — LANCETS 33 GAUGE
EACH MISCELLANEOUS
Qty: 100 EACH | Refills: 3 | Status: SHIPPED | OUTPATIENT
Start: 2024-05-22

## 2024-05-22 RX ORDER — BLOOD-GLUCOSE METER
KIT MISCELLANEOUS
Qty: 1 KIT | Refills: 0 | Status: SHIPPED | OUTPATIENT
Start: 2024-05-22

## 2024-05-22 NOTE — PROGRESS NOTES
Ambulatory Visit  Name: Saloni Malone      : 1980      MRN: [id number]  Encounter Provider: EDVIN Stroud  Encounter Date: 2024   Encounter department: [facility]    Assessment & Plan   1. Type 2 diabetes mellitus with hyperglycemia, without long-term current use of insulin ([facility])  Assessment & Plan:  - Pt poc glucose in office 216   - Will start patient on metformin 500mg q24  - Referral made to nutritionist  - Educated patient on glucometer and to use at least 1x a day   - Labs ordered for August   - Started on 20mg of atorvastatin- currently    -       Lab Results   Component Value Date    HGBA1C 8.8 (H) 2024     Orders:  -     IRIS Diabetic eye exam  -     Albumin / creatinine urine ratio; Future  -     POCT urine dip  -     POCT blood glucose  -     Albumin / creatinine urine ratio  -     Ambulatory referral to Diabetic Education - use to refer for diabetes group classes, individual diabetes education, medical nutrition therapy, device training; Future; Expected date: 2024  -     Hemoglobin A1C; Future; Expected date: 2024  -     Comprehensive metabolic panel; Future; Expected date: 2024  -     Lipid Panel with Direct LDL reflex; Future; Expected date: 2024  -     atorvastatin (LIPITOR) 20 mg tablet; Take 1 tablet (20 mg total) by mouth every evening  -     metFORMIN (GLUCOPHAGE-XR) 500 mg 24 hr tablet; Take 1 tablet (500 mg total) by mouth daily with dinner  2. Dizziness  Assessment & Plan:  - Pt reports vertigo in the past . She has had dizziness x 2 days that feels similar to prior episodes   - She had a negative romberg test in office   - 25 mg of meclizine PRN   Orders:  -     meclizine (ANTIVERT) 25 mg tablet; Take 1 tablet (25 mg total) by mouth 3 (three) times a day as needed for dizziness      Depression Screening and Follow-up Plan: Patient was screened for depression during today's encounter. They screened negative  with a PHQ-2 score of 1.      History of Present Illness   {Disappearing Hyperlinks I Encounters * My Last Note * Since Last Visit * History :54741}  Pt reports nausea and dizziness for the past 3 days. She also reports increase in urination and drinking through the night. Pt denies chest pain,palpitations and sob . She denies weakness as well     Diabetes  She presents for her initial diabetic visit. She has type 2 diabetes mellitus. Hypoglycemia symptoms include dizziness and headaches. Pertinent negatives for hypoglycemia include no speech difficulty or tremors. Associated symptoms include fatigue, polydipsia and polyuria. Pertinent negatives for diabetes include no chest pain.       Review of Systems   Constitutional:  Positive for fatigue. Negative for activity change, appetite change, chills, fever and unexpected weight change.   HENT:  Negative for congestion, facial swelling, rhinorrhea, sinus pressure, sinus pain, sneezing and trouble swallowing.    Eyes:  Negative for photophobia, discharge, redness and visual disturbance.   Respiratory:  Positive for cough.    Cardiovascular:  Negative for chest pain, palpitations and leg swelling.   Gastrointestinal:  Positive for nausea. Negative for abdominal distention, abdominal pain, anal bleeding, blood in stool, constipation, diarrhea and vomiting.   Endocrine: Positive for polydipsia and polyuria.   Genitourinary:  Positive for frequency. Negative for difficulty urinating.   Musculoskeletal:  Negative for arthralgias, back pain, gait problem, joint swelling, myalgias, neck pain and neck stiffness.   Neurological:  Positive for dizziness, light-headedness and headaches. Negative for tremors, syncope, facial asymmetry, speech difficulty and numbness.   Psychiatric/Behavioral:  Negative for agitation and suicidal ideas.        Objective   {Disappearing Hyperlinks   Review Vitals * Enter New Vitals * Results Review * Labs * Imaging * Cardiology * Procedures * Lung  "Cancer Screening :45061}  /82 (BP Location: Left arm, Patient Position: Sitting, Cuff Size: Adult)   Pulse 66   Ht 5' 4.5\" (1.638 m)   Wt 82.6 kg (182 lb)   SpO2 98%   BMI 30.76 kg/m²     Physical Exam  Vitals and nursing note reviewed.   Constitutional:       General: She is not in acute distress.     Appearance: Normal appearance. She is well-developed. She is not diaphoretic.   HENT:      Head: Normocephalic and atraumatic.      Right Ear: Tympanic membrane and external ear normal.      Left Ear: Tympanic membrane and external ear normal.      Nose: Nose normal.      Mouth/Throat:      Mouth: Mucous membranes are moist.      Pharynx: No oropharyngeal exudate or posterior oropharyngeal erythema.   Eyes:      Extraocular Movements: Extraocular movements intact.      Conjunctiva/sclera: Conjunctivae normal.      Pupils: Pupils are equal, round, and reactive to light.   Neck:      Thyroid: No thyromegaly.      Vascular: No carotid bruit or JVD.   Cardiovascular:      Rate and Rhythm: Normal rate and regular rhythm.      Pulses: no weak pulses.           Carotid pulses are 2+ on the right side and 2+ on the left side.       Dorsalis pedis pulses are 2+ on the right side and 2+ on the left side.        Posterior tibial pulses are 2+ on the right side and 2+ on the left side.      Heart sounds: Normal heart sounds, S1 normal and S2 normal. No murmur heard.  Pulmonary:      Effort: Pulmonary effort is normal.      Breath sounds: Normal breath sounds.   Abdominal:      General: Bowel sounds are normal.      Palpations: Abdomen is soft.      Tenderness: There is no abdominal tenderness.   Musculoskeletal:         General: Normal range of motion.      Cervical back: Normal range of motion.      Right lower leg: No edema.      Left lower leg: No edema.   Feet:      Right foot:      Skin integrity: Dry skin present. No ulcer, skin breakdown, erythema, warmth or callus.      Left foot:      Skin integrity: Dry skin " present. No ulcer, skin breakdown, erythema, warmth or callus.   Lymphadenopathy:      Cervical: No cervical adenopathy.   Skin:     General: Skin is warm.      Capillary Refill: Capillary refill takes less than 2 seconds.   Neurological:      General: No focal deficit present.      Mental Status: She is alert and oriented to person, place, and time. Mental status is at baseline.      Cranial Nerves: No cranial nerve deficit.      Sensory: No sensory deficit.      Motor: Motor function is intact. No weakness.      Coordination: Coordination is intact. Coordination normal.      Gait: Gait is intact. Gait normal.   Psychiatric:         Mood and Affect: Mood normal.         Behavior: Behavior normal.         Thought Content: Thought content normal.         Judgment: Judgment normal.       Patient's shoes and socks removed.    Right Foot/Ankle   Right Foot Inspection  Skin Exam: skin normal, skin intact and dry skin. No warmth, no callus, no erythema, no maceration, no abnormal color, no pre-ulcer, no ulcer and no callus.     Toe Exam: ROM and strength within normal limits.     Sensory   Vibration: intact  Proprioception: intact  Monofilament testing: intact    Vascular  Capillary refills: < 3 seconds  The right DP pulse is 2+. The right PT pulse is 2+.     Left Foot/Ankle  Left Foot Inspection  Skin Exam: skin normal, skin intact and dry skin. No warmth, no erythema, no maceration, normal color, no pre-ulcer, no ulcer and no callus.     Toe Exam: ROM and strength within normal limits.     Sensory   Vibration: intact  Proprioception: intact  Monofilament testing: intact    Vascular  Capillary refills: < 3 seconds  The left DP pulse is 2+. The left PT pulse is 2+.     Assign Risk Category  No deformity present  No loss of protective sensation  No weak pulses  Risk: 0     Administrative Statements {Disappearing Hyperlinks I  Level of Service * [id number]

## 2024-05-22 NOTE — ASSESSMENT & PLAN NOTE
- Pt reports vertigo in the past . She has had dizziness x 2 days that feels similar to prior episodes   - She had a negative romberg test in office   - 25 mg of meclizine PRN

## 2024-05-22 NOTE — TELEPHONE ENCOUNTER
Patient calling in states she had lab work done and was in the office on 5/25/24, A1c was 8.8 so PCP ordered glucose monitor for patient, has not arrived yet. Patient states she sent a message yesterday about feeling dizzy and weak, PCP suggested BS could be high or low causing this. Patient having increased thirst, urination, and dizziness. Scheduled OV for tonight.

## 2024-05-22 NOTE — ASSESSMENT & PLAN NOTE
- Pt poc glucose in office 216   - Will start patient on metformin 500mg q24  - Referral made to nutritionist  - Educated patient on glucometer and to use at least 1x a day   - Labs ordered for August   - Started on 20mg of atorvastatin- currently    -       Lab Results   Component Value Date    HGBA1C 8.8 (H) 05/16/2024

## 2024-05-22 NOTE — PATIENT INSTRUCTIONS
Type 2 Diabetes in Adults: New Diagnosis   AMBULATORY CARE:   Type 2 diabetes  is a disease that affects how your body uses glucose (sugar). Normally, when the blood sugar level increases, the pancreas makes more insulin. Insulin helps move sugar out of the blood so it can be used for energy. Type 2 diabetes develops because either the body cannot make enough insulin, or it cannot use the insulin correctly. Type 2 diabetes can be controlled to prevent damage to your heart, blood vessels, and other organs.       Common symptoms include the following:   Numbness in your fingers or toes    Blurred vision    Urinating often    More hunger or thirst than usual    Have someone call your local emergency number (911 in the [place]) if:   You have any of the following signs of a stroke:      Numbness or drooping on one side of your face     Weakness in an arm or leg    Confusion or difficulty speaking    Dizziness, a severe headache, or vision loss    You have any of the following signs of a heart attack:      Squeezing, pressure, or pain in your chest    You may  also have any of the following:     Discomfort or pain in your back, neck, jaw, stomach, or arm    Shortness of breath    Nausea or vomiting    Lightheadedness or a sudden cold sweat    You have trouble breathing.    Seek care immediately if:   You have severe abdominal pain.    You vomit for more than 2 hours.    You have trouble staying awake or focusing.    You are shaking or sweating.    You feel weak or more tired than usual.    You have blurred or double vision.    Your breath has a fruity, sweet smell.    Call your doctor or diabetes care team provider if:   Your arms and legs are swollen.    You have an upset stomach and cannot eat the foods on your meal plan.    You feel dizzy, have headaches, or are easily irritated.    Your skin is red, warm, dry, or swollen.    You have a wound that does not heal.    You have numbness in your arms or legs.    You have  trouble coping with your illness, or you feel anxious or depressed.    You have trouble following any part of your care plan, such as your meal plan.    You have questions or concerns about your condition or care.    Treatment for type 2 diabetes  helps prevent or delay complications, including heart and kidney disease. You must eat healthy meals and do regular physical activity. Your diabetes providers may ask about your home life so they can create a care plan that works best for you. Your plan may  include any of the following:  Diabetes medicines or insulin  may be given to decrease the amount of sugar in your blood.    Blood pressure medicine  may be given to lower your blood pressure.    Cholesterol-lowering medicine  may be given to prevent heart disease.    Antiplatelets , such as aspirin, help prevent blood clots. Take your antiplatelet medicine exactly as directed. These medicines make it more likely for you to bleed or bruise. If you are told to take aspirin, do not take acetaminophen or ibuprofen instead.    Take your medicine as directed.  Contact your healthcare provider if you think your medicine is not helping or if you have side effects. Tell your provider if you are allergic to any medicine. Keep a list of the medicines, vitamins, and herbs you take. Include the amounts, and when and why you take them. Bring the list or the pill bottles to follow-up visits. Carry your medicine list with you in case of an emergency.    Tests  may be used to check for type 2 diabetes starting at age 35 or sooner if you have 1 or more risk factors. Any of the following may be used to diagnose diabetes or check that it is well controlled:  An A1c test  shows the average amount of sugar in your blood over the past 2 to 3 months. Your diabetes care team provider will tell you the A1c level that is right for you.    A fasting plasma glucose test  is when your blood sugar level is tested after you have not eaten for 8  hours.    A 2-hour plasma glucose test  starts with a blood sugar level check after you have not eaten for 8 hours. You are then given a glucose drink. Your blood sugar level is checked after 2 hours.    A random glucose test  may be done any time of day, no matter how long ago you ate.    Diabetes education:  Diabetes education will start right away. Diabetes education may also happen later to refresh your memory. Your diabetes care team may include physicians, nurse practitioners, community health providers, and physician assistants. It may also include nurses, dietitians, exercise specialists, pharmacists, dentists, and podiatrists. Family members, or others who are close to you, may also be part of the team. You and your team will make goals and plans to manage diabetes and other health problems. The plans and goals will be specific to your needs. Members of your diabetes care team will teach you the following:  About nutrition:  A dietitian will help you make a meal plan to keep your blood sugar level steady. You will learn how food affects your blood sugar levels. You will also learn to keep track of carbohydrates (sugar and starchy foods). Do not skip meals. Your blood sugar level may drop too low if you have taken diabetes medicine and do not eat. You may be taught to use the plate method for portion control. With the plate method, ½ of your plate contains non-starchy vegetables. The other half is divided so ¼ contains protein, and ¼ contains carbohydrates. Ask your care team for more information about meal planning.         About physical activity and diabetes:  You will learn why physical activity, such as walking, is important. You and your care team provider will make a plan for your activity.            About a healthy body weight:  You will learn how a healthy weight can help you control diabetes and prevent heart disease. Ask your team what a healthy weight is for you. Ask your team to help you create  a weight-loss plan, if needed. Even a loss of 3% to 7% of your excess body weight can help make a difference in managing diabetes. Your team will help you set manageable weight-loss goals, such as 10 to 15 pounds, or 5% of your extra weight.    About your blood sugar level:  You will learn what your blood sugar level should be. You will be given information on when and how to check your blood sugar level. You may need to check by testing a drop of blood in a glucose monitor. You may instead be given a continuous glucose monitoring (CGM) device. A sensor is placed in your abdomen or on your arm. You put a transmitter on the sensor to get a reading that shows up on the monitor. You will learn what to do if your level is too high or too low. Write down the times of your checks and your levels. Take them to all follow-up appointments.            About diabetes medicine:  You may need oral diabetes medicine, insulin, or both to help control your blood sugar levels. Your healthcare provider will teach you how and when to take oral diabetes medicine. You will also be taught about side effects oral diabetes medicine can cause. Insulin may be added if oral diabetes medicine becomes less effective over time. Insulin may be injected, or given through a pump or pen. You and your care team will discuss which method is best for you.    An insulin pump  is an implanted device that gives your insulin 24 hours a day. An insulin pump prevents the need for multiple insulin injections in a day.         An insulin pen  is a device prefilled with the right amount of insulin.         You and your family members will be taught how to draw up and give insulin  if this is the best method for you. Your education team will also teach you how to dispose of needles and syringes.    You will learn how much insulin you need  and when to give it. You will be taught when not to give insulin. You will also be taught what to do if your blood sugar  level drops too low. This may happen if you take insulin and do not eat the right amount of carbohydrates.    Other ways to help manage type 2 diabetes:   Talk to your care team providers if you have increased stress about your diagnosis.  Stress about your diagnosis can keep you from taking care of yourself properly. Your team can help by offering tips about self-care. Your team may suggest you talk to a mental health provider who can listen and offer help with self-care issues. Other types of counseling can help you make nutrition or physical activity changes.    Check your feet each day for sores.  Wear shoes and socks that fit correctly. Do not trim your toenails. Go to a podiatrist. Ask your care team for more information about foot care.         Do not smoke.  Nicotine and other chemicals in cigarettes and cigars can cause lung damage and make diabetes harder to manage. Ask your care team provider for information if you currently smoke and need help to quit. E-cigarettes or smokeless tobacco still contain nicotine. Talk to your care team provider before you use these products.    Drink water instead of sugary drinks such as soft drinks and fruit juices.  Sugary drinks increase your blood sugar level and weight. Your healthcare provider may tell you that diet drinks do not help with weight loss.    Know the risks if you choose to drink alcohol.  Alcohol can cause your blood sugar levels to be low if you use insulin. Alcohol can cause high blood sugar levels and weight gain if you drink too much. A drink of alcohol is 12 ounces of beer, 5 ounces of wine, or 1½ ounces of liquor. Your care team can tell you how many drinks are okay to have in 24 hours and in 1 week.    Check your blood pressure as directed.  If you have high blood pressure (BP), talk to your care team about your BP goals. Together you can create a plan to lower your BP if needed and keep it in a healthy range. The plan may include lifestyle  changes or medicines. A normal BP is 119/79 or lower. A normal blood pressure can help prevent or delay certain complications from diabetes. Examples include retinopathy (eye damage) and kidney damage.         Wear medical alert identification.  Wear medical alert jewelry or carry a card that says you have diabetes. Ask your care team provider where to get these items.         Ask about vaccines you may need.  You have a higher risk for serious illness if you get the flu, pneumonia, COVID-19, or hepatitis. Ask your provider if you should get vaccines to prevent these or other diseases, and when to get the vaccines.    Risks of type 2 diabetes:  It is important to learn to keep your diabetes in control. Uncontrolled diabetes can damage your nerves, veins, and arteries. Your risk for dementia increases faster the longer your diabetes is not controlled. High blood sugar levels may damage other body tissues and organs over time. Damage to arteries may increase your risk for heart attack and stroke. Nerve damage may also lead to other heart, stomach, and nerve problems. Diabetes can become life-threatening if it is not controlled.  Follow up with your care team providers as directed:  You may need to return to have your A1c checked every 3 months. You will need to have your feet checked during at least 1 visit each year. You will need an eye exam 1 time each year to check for retinopathy. You will also need tests to check for kidney or heart disease, and high blood pressure. Write down your questions so you remember to ask them during your visits.  © Copyright [employer] 2023 Information is for End User's use only and may not be sold, redistributed or otherwise used for commercial purposes.  The above information is an  only. It is not intended as medical advice for individual conditions or treatments. Talk to your doctor, nurse or pharmacist before following any medical regimen to see if it is safe and  effective for you.    Foot Care for People with Diabetes   AMBULATORY CARE:   What you need to know about foot care:  Long-term high blood sugar levels can damage the blood vessels and nerves in your legs and feet. This damage makes it hard to feel pressure, pain, temperature, and touch. You may not be able to feel a cut or sore, or shoes that are too tight. Foot care is needed to prevent serious problems, such as an infection or amputation. Diabetes may cause your toes to become crooked or curved under. These changes may affect the way you walk and can lead to increased pressure on your foot. The pressure can decrease blood flow to your feet. Lack of blood flow increases your risk for a foot ulcer.  Call your care team provider if:   Your feet become numb, weak, or hard to move.    You have pus draining from a sore on your foot.    You have a wound on your foot that gets bigger, deeper, or does not heal.    You see blisters, cuts, scratches, calluses, or sores on your foot.    You have a fever, and your feet become red, warm, and swollen.    Your toenails become thick, curled, or yellow.    You find it hard to check your feet because your vision is poor.    You have questions or concerns about your condition or care.    How to care for your feet:   Check your feet each day.  Look at your whole foot, including the bottom, and between and under your toes. Check for wounds, corns, and calluses. Use a mirror to see the bottom of your feet. The skin on your feet may be shiny, tight, or darker than normal. Your feet may also be cold and pale. Feel your feet by running your hands along the tops, bottoms, sides, and between your toes. Redness, swelling, and warmth are signs of blood flow problems that can lead to a foot ulcer. Do not try to remove corns or calluses yourself. Do not ignore small problems, such as dry skin or small wounds. These can become life-threatening over time without proper care.         Wash your  feet each day with soap and warm water.  Do not use hot water, because this can injure your foot. Dry your feet gently with a towel after you wash them. Dry between and under your toes.    Apply lotion or a moisturizer on your dry feet.  Ask your care team provider what lotions are best to use. Do not put lotion or moisturizer between your toes. Moisture between your toes could lead to skin breakdown.    Cut your toenails correctly.  File or cut your toenails straight across. Use a soft brush to clean around your toenails. If your toenails are very thick, you may need to have a care team provider or specialist cut them.     Protect your feet.  Do not walk barefoot or wear your shoes without socks. Check your shoes for rocks or other objects that can hurt your feet. Wear cotton socks to help keep your feet dry. Wear socks without toe seams, or wear them with the seams inside out. Change your socks each day. Do not wear socks that are dirty or damp.    Wear shoes that fit well.  Wear shoes that do not rub against any area of your feet. Your shoes should be ½ to ¾ inch (1 to 2 centimeters) longer than your feet. Your shoes should also have extra space around the widest part of your feet. Walking or athletic shoes with laces or straps that adjust are best. Ask your care team provider for help to choose shoes that fit you best. Ask your provider if you need to wear an insert, orthotic, or bandage on your feet.         Go to your follow-up visits.  Your care team provider will do a foot exam at least 1 time each year. You may need a foot exam more often if you have nerve damage, foot deformities, or ulcers. Your provider will check for nerve damage and how well you can feel your feet. Your provider will check your shoes to see if they fit well.    Do not smoke.  Smoking can damage your blood vessels and put you at increased risk for foot ulcers. Ask your care team provider for information if you currently smoke and need  help to quit. E-cigarettes or smokeless tobacco still contain nicotine. Talk to your care team provider before you use these products.    Follow up with your diabetes care team provider or foot specialist as directed:  You will need to have your feet checked at least 1 time each year. You may need a foot exam more often if you have nerve damage, foot deformities, or ulcers. Write down your questions so you remember to ask them during your visits.  © Copyright [employer] 2023 Information is for End User's use only and may not be sold, redistributed or otherwise used for commercial purposes.  The above information is an  only. It is not intended as medical advice for individual conditions or treatments. Talk to your doctor, nurse or pharmacist before following any medical regimen to see if it is safe and effective for you.

## 2024-06-07 ENCOUNTER — TELEPHONE (OUTPATIENT)
Age: 44
End: 2024-06-07

## 2024-06-07 NOTE — TELEPHONE ENCOUNTER
OA COLON     Screened by: Isatu Pedro MA    Referring Provider pcp    Pre- Screening:     There is no height or weight on file to calculate BMI.  Has patient been referred for a routine screening Colonoscopy? yes  Is the patient between 45-75 years old? yes      Previous Colonoscopy no   If yes:    Date:     Facility:     Reason:       Does the patient want to see a Gastroenterologist prior to their procedure OR are they having any GI symptoms? NO    Has the patient been hospitalized or had abdominal surgery in the past 6 months? no    Does the patient use supplemental oxygen? no    Does the patient take Coumadin, Lovenox, Plavix, Elliquis, Xarelto, or other blood thinning medication? no    Has the patient had a stroke, cardiac event, or stent placed in the past year? no    Colonoscopy scheduled    If patient is between 45yrs - 49yrs, please advise patient that we will have to confirm benefits & coverage with their insurance company for a routine screening colonoscopy.     ASC Screening    ASC Screening  BMI > than 45: No  Are you currently pregnant?: No  Do you rely on a wheelchair for mobility?: No  Do you need oxygen during the day?: No  Have you ever been informed by anesthesia that you have a difficult airway?: No  Have you been diagnosed with End Stage Renal Disease (ESRD)?: No  Are you actively on dialysis?: No  Have you been diagnosed with Pulmonary Hypertension?: No  Do you have a pacemaker or an Automatic Implantable Cardioverter Defibrillator (AICD)?: No  Have you ever had an organ transplant?: No  Have you had a stroke, heart attack, myocardial infarction (MI) within the last 6 months?: No  Have you ever been diagnosed with Aortic Stenosis?: No  Have you ever been diagnosed  with Congestive Heart Failure?: No  Have you ever been diagnosed with a heart valve disease?: No  Are you Diabetic?: Yes  If you are Diabetic, has your A1C been greater than 12 within the last six months?: No       Name: Blaine  Saloni Malone MRN: [id number]   Date: 8/15/2024 Status: [facility]   Time: 9:45 AM  9:45 AM Length: 30  30   Visit Type: GI COLONOSCOPY [1102] Copay: $0.00   Provider: Diana M Jaiyeola, MD  [facility] GI ROOM 02       Bowel prep reviewed with patient:miralax  Instructions reviewed with patient by:clark dave  Clearances:  none

## 2024-06-20 ENCOUNTER — TELEPHONE (OUTPATIENT)
Dept: GASTROENTEROLOGY | Facility: MEDICAL CENTER | Age: 44
End: 2024-06-20

## 2024-07-02 ENCOUNTER — OFFICE VISIT (OUTPATIENT)
Dept: FAMILY MEDICINE CLINIC | Facility: CLINIC | Age: 44
End: 2024-07-02
Payer: COMMERCIAL

## 2024-07-02 VITALS
BODY MASS INDEX: 31.99 KG/M2 | SYSTOLIC BLOOD PRESSURE: 122 MMHG | WEIGHT: 192 LBS | DIASTOLIC BLOOD PRESSURE: 72 MMHG | HEART RATE: 76 BPM | HEIGHT: 65 IN | RESPIRATION RATE: 18 BRPM

## 2024-07-02 DIAGNOSIS — E11.65 TYPE 2 DIABETES MELLITUS WITH HYPERGLYCEMIA, WITHOUT LONG-TERM CURRENT USE OF INSULIN (HCC): Primary | ICD-10-CM

## 2024-07-02 DIAGNOSIS — E78.2 MIXED HYPERLIPIDEMIA: ICD-10-CM

## 2024-07-02 DIAGNOSIS — R05.3 CHRONIC COUGH: ICD-10-CM

## 2024-07-02 PROCEDURE — 99213 OFFICE O/P EST LOW 20 MIN: CPT | Performed by: PHYSICIAN ASSISTANT

## 2024-07-02 RX ORDER — METFORMIN HYDROCHLORIDE 500 MG/1
500 TABLET, EXTENDED RELEASE ORAL
Qty: 30 TABLET | Refills: 11 | Status: SHIPPED | OUTPATIENT
Start: 2024-07-02

## 2024-07-02 RX ORDER — ATORVASTATIN CALCIUM 20 MG/1
20 TABLET, FILM COATED ORAL EVERY EVENING
Qty: 30 TABLET | Refills: 11 | Status: SHIPPED | OUTPATIENT
Start: 2024-07-02

## 2024-07-02 NOTE — ASSESSMENT & PLAN NOTE
Patient has been doing a good job eliminating sweets and soda which she used to have much of every day.  She is frustrated that her numbers still are showing high but she does not know what to compare them to before her dietary changes so I tried to encourage the patient that her changes and efforts are making a difference.  Keep blood work for August.  Patient has her first diabetic education class this month.  Lab Results   Component Value Date    HGBA1C 8.8 (H) 05/16/2024

## 2024-07-02 NOTE — PROGRESS NOTES
Ambulatory Visit  Name: Saloni Malone      : 1980      MRN: [id number]  Encounter Provider: Sylwia Cates PA-C  Encounter Date: 2024   Encounter department: [facility] PRIMARY CARE    Assessment & Plan   1. Type 2 diabetes mellitus with hyperglycemia, without long-term current use of insulin (HCC)  Assessment & Plan:  Patient has been doing a good job eliminating sweets and soda which she used to have much of every day.  She is frustrated that her numbers still are showing high but she does not know what to compare them to before her dietary changes so I tried to encourage the patient that her changes and efforts are making a difference.  Keep blood work for August.  Patient has her first diabetic education class this month.  Lab Results   Component Value Date    HGBA1C 8.8 (H) 2024     Orders:  -     atorvastatin (LIPITOR) 20 mg tablet; Take 1 tablet (20 mg total) by mouth every evening  -     metFORMIN (GLUCOPHAGE-XR) 500 mg 24 hr tablet; Take 1 tablet (500 mg total) by mouth daily with dinner  2. Chronic cough  Assessment & Plan:  Resolved status post prednisone  3. Mixed hyperlipidemia  Assessment & Plan:  Patient was started on Lipitor 20 keep medication check and follow-up with labs for August.       History of Present Illness   {Disappearing Hyperlinks I Encounters * My Last Note * Since Last Visit * History :98630}  Patient presents with:  Follow-up: Patient present to discuss with Sylwia about diabetes. Per pt she changed her diet and she seems to be gaining weight and her sugar levels are still elevated.    Pt. has her first class set for later this month.   She has made diet changes and still   NO rice, no bread, no sweets and/or soda.         Review of Systems    Objective   {Disappearing Hyperlinks   Review Vitals * Enter New Vitals * Results Review * Labs * Imaging * Cardiology * Procedures * Lung Cancer Screening :43524}  /72 (BP Location: Right arm,  "Patient Position: Sitting, Cuff Size: Standard)   Pulse 76   Resp 18   Ht 5' 4.5\" (1.638 m)   Wt 87.1 kg (192 lb)   BMI 32.45 kg/m²     Physical Exam  Administrative Statements {Disappearing Hyperlinks I  Level of Service * [facility]/PCSP:76041}          "

## 2024-07-08 ENCOUNTER — HOSPITAL ENCOUNTER (OUTPATIENT)
Dept: MAMMOGRAPHY | Facility: MEDICAL CENTER | Age: 44
Discharge: HOME | End: 2024-07-08
Payer: COMMERCIAL

## 2024-07-08 VITALS — HEIGHT: 65 IN | BODY MASS INDEX: 31.99 KG/M2 | WEIGHT: 192 LBS

## 2024-07-08 DIAGNOSIS — Z12.31 ENCOUNTER FOR SCREENING MAMMOGRAM FOR MALIGNANT NEOPLASM OF BREAST: ICD-10-CM

## 2024-07-08 PROCEDURE — 77067 SCR MAMMO BI INCL CAD: CPT

## 2024-07-08 PROCEDURE — 77063 BREAST TOMOSYNTHESIS BI: CPT

## 2024-07-11 ENCOUNTER — HOSPITAL ENCOUNTER (OUTPATIENT)
Dept: MAMMOGRAPHY | Facility: CLINIC | Age: 44
Discharge: HOME | End: 2024-07-11
Payer: COMMERCIAL

## 2024-07-11 VITALS — BODY MASS INDEX: 31.99 KG/M2 | WEIGHT: 192 LBS | HEIGHT: 65 IN

## 2024-07-11 DIAGNOSIS — R92.8 ABNORMAL SCREENING MAMMOGRAM: ICD-10-CM

## 2024-07-11 PROCEDURE — 77065 DX MAMMO INCL CAD UNI: CPT

## 2024-07-11 NOTE — PROGRESS NOTES
[facility]   [facility] Note    Admitting Date and Time: 3/28/2022 11:17 AM  Admit Dx: Intractable nausea and vomiting [R11.2]  Abdominal pain [R10.9]    Subjective: Patient was admitted with Intractable nausea and vomiting [R11.2]  Abdominal pain [R10.9]. No vomiting. Did have 2 loose stools yesterday and 1 today. No blood. She states this is typical for her. Still taking pain medication around-the-clock. Is trying to do clear liquids and wants to advance her diet. ROS: denies fever, chills, cp, sob, n/v, HA unless stated above.  metoclopramide  10 mg IntraVENous TID AC    vitamin B-6  25 mg Oral BID    doxyLAMINE succinate  25 mg Oral BID    sertraline  25 mg Oral Nightly    sodium chloride flush  5-40 mL IntraVENous 2 times per day    enoxaparin  40 mg SubCUTAneous Daily     HYDROcodone 5 mg - acetaminophen, 1 tablet, Q6H PRN  ondansetron, 4 mg, Q6H PRN  sodium chloride flush, 5-40 mL, PRN  sodium chloride, , PRN  polyethylene glycol, 17 g, Daily PRN  acetaminophen, 650 mg, Q6H PRN   Or  acetaminophen, 650 mg, Q6H PRN         Objective:    BP (!) 142/90   Pulse 81   Temp 98.7 °F (37.1 °C) (Oral)   Resp 16   Ht 5' 4\" (1.626 m)   Wt 144 lb (65.3 kg)   LMP 02/17/2022   SpO2 99%   BMI 24.72 kg/m²   General Appearance: alert and oriented to person, place and time, well-developed and well-nourished, in no acute distress  Skin: warm and dry, no rash or erythema  Neck: neck supple and non tender   Pulmonary/Chest: clear to auscultation bilaterally  Cardiovascular: Regular, no murmur  Abdomen: soft, patient seems to be distractibility soft with tenderness bilateral lower quadrants but no guarding.   When not distracted patient guards  Ext: No peripheral edema    Recent Labs     04/01/22  0255      K 3.5      CO2 25   BUN 4*   CREATININE 0.5   GLUCOSE 95   CALCIUM 8.9       Recent Labs     04/01/22  0255   WBC 11.4   RBC 3.92   HGB 12.7   HCT 37.0   MCV Met with patient and Dr. Rodriguez                  regarding recommendation for;      __x___ RIGHT ______LEFT      _____Ultrasound guided  ___x___Stereotactic  Breast biopsy.      ___x__Verbalized understanding.      Blood thinners:  _____yes __x___no    Date stopped: _____n/a______    Biopsy teaching sheet given:  ____x___yes ______no    Pre procedure health information obtained. Pt has diabetes & hyperlipidemia.    94.4   MCH 32.4   MCHC 34.3   RDW 14.1      MPV 8.8     K 3.2  WBC 20.9  hCG 97,082 on 20  Micro data pending  Tox positive for [facility]    Radiology:   US OB LESS THAN 14 WEEKS SINGLE OR FIRST GESTATION   Final Result      MRI ABDOMEN WO CONTRAST   Final Result   1. No evident acute findings in the abdomen or pelvis. Specifically, no   findings of acute appendicitis. 2. Incomplete evaluation of a single intrauterine fetus. US ABDOMEN LIMITED   Final Result   Appendix not definitively identified. Therefore, appendicitis cannot be   entirely excluded. If there is persistent clinical concern, correlation with   repeat MRI could be considered. RECOMMENDATIONS:   Unavailable             Assessment:    Principal Problem:    Intractable nausea and vomiting  Active Problems:    Abdominal pain    Intractable vomiting    Abdominal pain, right lower quadrant    Diarrhea of presumed infectious origin    Marijuana abuse    AMA (advanced maternal age) multigravida 31+, first trimester  Resolved Problems:    * No resolved hospital problems. *      Plan:    1. Persistent nausea and vomiting  This is likely multifactorial.  She does have a history of Crohn's disease and although a mild is likely active. Hyperemesis gravidarum is also a possible contributor. Finally patient is a daily marijuana user and this may be contributing as well with hyperemesis related to marijuana use. She does appear to be feeling better. We will advance diet to regular. Continue IV fluids at this time with dextrose. I will give her Reglan IV before each meal to see if this improves her ability to keep down meals. Changing IV narcotics to p.o. today. If patient tolerates all these things she is amenable to discharge. We talked at length about the need to stopping marijuana completely at this time and she is in agreement. 2.  Pregnancy 9 weeks  Ultrasound done yesterday.     3.  Depression  Seen by psychiatry and started on Zoloft. It has been confirmed that patient does not have an active trichomonas infection. All antibiotics stopped.     Electronically signed by Jo Crane MD on 4/1/2022 at 9:19 AM

## 2024-07-23 ENCOUNTER — TELEPHONE (OUTPATIENT)
Dept: GASTROENTEROLOGY | Facility: MEDICAL CENTER | Age: 44
End: 2024-07-23

## 2024-07-24 ENCOUNTER — OFFICE VISIT (OUTPATIENT)
Dept: DIABETES SERVICES | Facility: CLINIC | Age: 44
End: 2024-07-24
Payer: COMMERCIAL

## 2024-07-24 VITALS — WEIGHT: 193.4 LBS | BODY MASS INDEX: 32.68 KG/M2

## 2024-07-24 DIAGNOSIS — E11.65 TYPE 2 DIABETES MELLITUS WITH HYPERGLYCEMIA, WITHOUT LONG-TERM CURRENT USE OF INSULIN (HCC): Primary | ICD-10-CM

## 2024-07-24 PROCEDURE — 97802 MEDICAL NUTRITION INDIV IN: CPT | Performed by: DIETITIAN, REGISTERED

## 2024-07-24 NOTE — PROGRESS NOTES
"Medical Nutrition Therapy        Assessment    Visit Type: Initial visit  Chief complaint/Medical Diagnosis/reason for visit Type 2 diabetes mellitus with hyperglycemia, without long-term current use of insulin (HCC)     TINA Guallpa was seen today for her initial MNT visit. Saloni reports that she is newly diagnosed with diabetes. Her A1C was 8.8. Patient states that she was self-monitoring her blood glucose. However, she was not seeing good readings, got discouraged and stopped testing. Encouraged her to resume testing once a day and stagger the testing times. Saloni already initiated dietary modifications. She stopped consuming two liters of soda a day and now may only have one cup 2-3 times a week. She no longer has a cookie for her afternoon snack. Problems identified in food recall include inconsistent carbohydrate intake at meals, meal skipping, and low intake of plant based foods. Provided patient with carbohydrate guidelines for a 1300 calorie meal plan to assist with consistency, balance and portion control. Encouraged the consumption of regular meals at regular times 4-5 hours apart--no skipping.  Advised patient to keep carbohydrate intake to 30-45 grams per meal and 15 grams per afternoon or evening snack to assist with glycemic control.  Suggested she pre-plan her meals a snacks to ensure adherence to the meal plan. Portion booklet and food labels were used to teach basic carbohydrate counting. Saloni would benefit from initiating regular aerobic exercise. Patient agreed to keep daily food logs. Follow-up TBD. RD will remain available for further dietary questions/concerns.     Ht Readings from Last 1 Encounters:   07/11/24 5' 4.5\" (1.638 m)     Wt Readings from Last 3 Encounters:   07/24/24 87.7 kg (193 lb 6.4 oz)   07/11/24 87.1 kg (192 lb)   07/08/24 87.1 kg (192 lb)     Weight Change: Yes 8 pound weight gain in the past month.    Barriers to Learning: no barriers    Do you follow any special diet presently?: " Yes - cut out refined sugar and is monitoring portions of other carbs  Who shops: patient  Who cooks: patient    Food Log: Completed via the method of food recall    Breakfast:8:00-8:30AM eggs whites, 1 greek fruit flavored yogurt, 1/4 cup Kind granola, 8oz [place] regular iced tea or water  Morning Snack:none  Lunch:1:00PM SKIPS 2-3 times a week; leftovers or a turkey sandwich with moser or a snack: 6-8 saltine crackers and tuna, water  Afternoon Snack: none  Dinner:6:30-7:00PM 2 tortilla with beans chicken lettuce and tomatoes and hot sauce (in the past would eat 3-4) OR broccoli, asparagus and baked chicken or 1.5 cups pasta with karthik sauce or red sauce and a protein either chicken, shrimp or beef; water  Evening Snack:none  Beverages: water and regular green iced tea or regular soda (2-3 times a week)  Eating out/Take out:2-3 times a week on the weekends  Exercise nothing beyond daily activities    Calorie needs 1300kcals/day Carbs: 30-45g/meal, 15g/snack         Nutrition Diagnosis:  Food and nutrition related knowledge deficit  related to Lack of prior exposure to accurate nutrition related information as evidenced by No prior knowledge of need for food and nutrition related recommendations    Intervention: plate method, label reading, behavior modification strategies, carbohydrate counting, increased plant based foods, meal timing, meal planning, monitoring portion control, exercise guidelines, and food diary     Treatment Goals: Patient will consume 3 meals a day, Patient will count carbohydrates, and Patient will exercise    Monitoring and evaluation:    Term code indicator  FH 1.3.2 Food Intake Criteria: Consume 3 meals a day about 4-5 hours apart--no skipping.  Term code indicator  FH 1.6.3 Carbohydrate Intake Criteria: 30-45 grams of carbohydrate per meal and 15 grams per afternoon snack.  Term code indicator  CH 2.2 Treatments/Therapy/Alternative Medicine Criteria: Initiate regular aerobic exercise.      Materials Provided: Portion booklet and food logs    Patient’s Response to Instruction:  Comprehensiongood  Motivationgood  Expected Compliancegood    Start- Stop: 7:25-8:10  Total Minutes: 45 Minutes  Group or Individual Instruction: 45  Other: EDVIN Stroud    Thank you for coming to the [facility] for education today.  Please feel free to call with any questions or concerns.    Irineo Lord  [address] [id number]

## 2024-07-24 NOTE — PATIENT INSTRUCTIONS
Consume 3 meals a day about 4-5 hours apart--no skipping.  30-45 grams of carbohydrate per meal and 15 grams per afternoon snack.  Initiate regular aerobic exercise.

## 2024-07-29 ENCOUNTER — HOSPITAL ENCOUNTER (OUTPATIENT)
Dept: MAMMOGRAPHY | Facility: CLINIC | Age: 44
Discharge: HOME | End: 2024-07-29

## 2024-07-29 ENCOUNTER — HOSPITAL ENCOUNTER (OUTPATIENT)
Dept: MAMMOGRAPHY | Facility: CLINIC | Age: 44
Discharge: HOME | End: 2024-07-29
Admitting: RADIOLOGY
Payer: COMMERCIAL

## 2024-07-29 VITALS — DIASTOLIC BLOOD PRESSURE: 82 MMHG | HEART RATE: 80 BPM | SYSTOLIC BLOOD PRESSURE: 129 MMHG

## 2024-07-29 DIAGNOSIS — R92.8 ABNORMAL MAMMOGRAM: ICD-10-CM

## 2024-07-29 DIAGNOSIS — R92.0 MAMMOGRAPHIC MICROCALCIFICATION: ICD-10-CM

## 2024-07-29 PROCEDURE — 88360 TUMOR IMMUNOHISTOCHEM/MANUAL: CPT | Performed by: STUDENT IN AN ORGANIZED HEALTH CARE EDUCATION/TRAINING PROGRAM

## 2024-07-29 PROCEDURE — 88305 TISSUE EXAM BY PATHOLOGIST: CPT | Performed by: STUDENT IN AN ORGANIZED HEALTH CARE EDUCATION/TRAINING PROGRAM

## 2024-07-29 PROCEDURE — A4648 IMPLANTABLE TISSUE MARKER: HCPCS

## 2024-07-29 PROCEDURE — 19081 BX BREAST 1ST LESION STRTCTC: CPT

## 2024-07-29 RX ORDER — LIDOCAINE HYDROCHLORIDE 10 MG/ML
5 INJECTION, SOLUTION EPIDURAL; INFILTRATION; INTRACAUDAL; PERINEURAL ONCE
Status: COMPLETED | OUTPATIENT
Start: 2024-07-29 | End: 2024-07-29

## 2024-07-29 RX ORDER — LIDOCAINE HYDROCHLORIDE AND EPINEPHRINE BITARTRATE 20; .01 MG/ML; MG/ML
10 INJECTION, SOLUTION SUBCUTANEOUS ONCE
Status: COMPLETED | OUTPATIENT
Start: 2024-07-29 | End: 2024-07-29

## 2024-07-29 RX ADMIN — LIDOCAINE HYDROCHLORIDE AND EPINEPHRINE 10 ML: 20; 10 INJECTION, SOLUTION INFILTRATION; PERINEURAL at 14:01

## 2024-07-29 RX ADMIN — LIDOCAINE HYDROCHLORIDE 5 ML: 10 INJECTION, SOLUTION EPIDURAL; INFILTRATION; INTRACAUDAL; PERINEURAL at 14:01

## 2024-07-29 NOTE — PROGRESS NOTES
Ice pack given:    __x___yes _____no        Discharge instructions given to patient:    __x___yes _____no    Discharged via:    _____ambulatory    _____wheelchair    _____stretcher    Stable on discharge:    __x___yes ____no    Band aid and pressure dressing clean,dry and intact.

## 2024-07-29 NOTE — PROGRESS NOTES
Patient arrived via:    __x__ambulatory    _____wheelchair    _____stretcher      Domestic violence screen    ___x___negative______positive    Breast Implants:    _______yes ____x____no

## 2024-07-29 NOTE — PROGRESS NOTES
Procedure type:    _____ultrasound guided __x___stereotactic    Breast:    _____Left __x___Right    Location: right Breast 1000    Needle:8G    # of passes:5 (2 with calcs and 3 without calcs)    Clip: U clip    Performed by:Dr. Mason    Pressure held for 5 minutes by:Andree Gómez RN    Steri Strips:    __x___yes _____no    Band aid:    __x___yes_____no    Tape and guaze:    __x___yes _____no    Tolerated procedure:    ___x__yes _____no

## 2024-07-30 PROCEDURE — 88305 TISSUE EXAM BY PATHOLOGIST: CPT | Performed by: STUDENT IN AN ORGANIZED HEALTH CARE EDUCATION/TRAINING PROGRAM

## 2024-07-30 PROCEDURE — 88360 TUMOR IMMUNOHISTOCHEM/MANUAL: CPT | Performed by: STUDENT IN AN ORGANIZED HEALTH CARE EDUCATION/TRAINING PROGRAM

## 2024-07-31 ENCOUNTER — ANESTHESIA EVENT (OUTPATIENT)
Dept: ANESTHESIOLOGY | Facility: HOSPITAL | Age: 44
End: 2024-07-31

## 2024-07-31 ENCOUNTER — ANESTHESIA (OUTPATIENT)
Dept: ANESTHESIOLOGY | Facility: HOSPITAL | Age: 44
End: 2024-07-31

## 2024-08-01 ENCOUNTER — TELEPHONE (OUTPATIENT)
Dept: MAMMOGRAPHY | Facility: CLINIC | Age: 44
End: 2024-08-01

## 2024-08-01 DIAGNOSIS — C50.911 INVASIVE DUCTAL CARCINOMA OF BREAST, FEMALE, RIGHT (HCC): Primary | ICD-10-CM

## 2024-08-01 NOTE — TELEPHONE ENCOUNTER
Call placed to patient after pt given biopsy results from radiologist, questions answered, adv next step is to set patient up with surgeon, options discussed and patient wanted to have appt made with Dr. Keeley Carrillo, Hope line notified and will reach out with appointment.   Offered support.  Journal mailed.

## 2024-08-02 ENCOUNTER — PATIENT OUTREACH (OUTPATIENT)
Dept: HEMATOLOGY ONCOLOGY | Facility: CLINIC | Age: 44
End: 2024-08-02

## 2024-08-02 ENCOUNTER — DOCUMENTATION (OUTPATIENT)
Dept: HEMATOLOGY ONCOLOGY | Facility: CLINIC | Age: 44
End: 2024-08-02

## 2024-08-02 ENCOUNTER — TELEPHONE (OUTPATIENT)
Dept: GASTROENTEROLOGY | Facility: MEDICAL CENTER | Age: 44
End: 2024-08-02

## 2024-08-02 ENCOUNTER — TELEPHONE (OUTPATIENT)
Dept: HEMATOLOGY ONCOLOGY | Facility: CLINIC | Age: 44
End: 2024-08-02

## 2024-08-02 DIAGNOSIS — C50.911 MALIGNANT NEOPLASM OF RIGHT FEMALE BREAST, UNSPECIFIED ESTROGEN RECEPTOR STATUS, UNSPECIFIED SITE OF BREAST (HCC): Primary | ICD-10-CM

## 2024-08-02 PROBLEM — Z17.1 MALIGNANT NEOPLASM OF UPPER-OUTER QUADRANT OF RIGHT BREAST IN FEMALE, ESTROGEN RECEPTOR NEGATIVE (HCC): Status: ACTIVE | Noted: 2024-08-02

## 2024-08-02 PROBLEM — C50.411 MALIGNANT NEOPLASM OF UPPER-OUTER QUADRANT OF RIGHT BREAST IN FEMALE, ESTROGEN RECEPTOR NEGATIVE (HCC): Status: ACTIVE | Noted: 2024-08-02

## 2024-08-02 NOTE — PROGRESS NOTES
Referral received/ Chart reviewed for work up completed     Imaging completed:  2024 Mammo Screening bilat, 2024 Mammo diag right, 24 Mammo post completed at [facility]    Pathology completed:  Tissue Exam 2024  at [facility]    No record retrieval needed. All records are in patients chart  _____________________________________    Breast Cancer Nurse Navigator    Chart reviewed for prepping for initial outreach by nurse navigator.    Nurse Navigator and Patient Navigator added to care team    Diagnosis: Right breast IDC ER/SC-HER2+    Recent Imagin Mammo screening bilateral w 3d & cad    Recommendation: Diag mammo current time right breast. Routine screening mammo 1 year for left  2024 Mammo diagnostic right w cad   Impression: Low suspicion upper outer right breast calcifications   Recommendation: Stereotactic breast biopsy for the right breast.   2024 Mammo post biopsy, Mammo stereotactic breast biopsy (all inc)    Recent Pathology: 2024 Tissue Exam  Breast, right, 10:00, specimen #1, with calcifications, stereotactic biopsy: Invasive ductal carcinoma.  Gr 2 ER- SC- HER2+(3+)  Breast, right, 10:00, specimen #2, without calcifications, stereotactic biopsy: Invasive ductal carcinoma and DCIS, similar to that in specimen A    Does patient have a LEOBARDO ? no    Genetic testing: tbd    Family history of cancer:  No known family history of breast cancer.     Previous Breast Cancer Diagnosis:  No    Any further needs:    Possible LEOBARDO placement    Other:    Information forwarded to Outbound PEP: 2024      Nurse Navigator will call patient for initial outreach.      Will have Patient Navigator outreach patient after medical oncology consultation.  Any clinical questions to be directed to ONN or office nurse.

## 2024-08-02 NOTE — TELEPHONE ENCOUNTER
left voicemail for Pt on 8/2/24 @ 4:10 for introductions and to discuss current financial status by request of nurse navigator. Writer encouraged a call back from Pt to provide an overview of insurance and answer any questions she may have. Writer explained that he should be utilized as a main point of contact regarding any financial stressors throughout Pt medical treatment. Writer provided contact information to PT for call back. [employer] will set reminders to follow up again on 8/5/24.          Ben Stewart  Phone:[phone]  Email:[email or web address]

## 2024-08-02 NOTE — PROGRESS NOTES
Breast Oncology Nurse Navigator    Called patient for initial outreach from nurse navigator. Left voicemail message with contact information, including office hours.  Requested a call back.  Received referral on 08/01 and scheduled on 08/02 with Dr Carrillo for 08/07.    Patient returned call at 1500    Breast Oncology Nurse Navigator    [facility]  Called patient for initial outreach from nurse navigator.  Introduced myself and my role as well as members of the navigation team.  Oncology Nurse Navigator will follow patient until they are seen by medical oncology, at which point the patient navigator will follow the patient to survivorship.     Breast cancer diagnosis was made after Stereotactic biopsy  Patient states understanding/awareness of diagnosis.    Confirmed upcoming appointment with Dr. Carrillo for 08/07/2024  Patient will be accompanied by her friend but may come alone. She would like to have her  listen in via speaker phone, recommended she make Dr. Carrillo aware.  Patient has transportation to appointments. She is a [employer] employee. Discussed what to expect at the office visit, including review of her pathology results, surgical recommendations, and recommendations to both a medical and radiation oncologist as standard of care. Patient is expressed concerned about her diagnosis. Due to lack of insurance in the past this was her first mammogram.     Patient lives with 3 of her children, one lives nearby and her  lives in [place] at this time  Support system includes: her , 4 children and close family and friends    Patient a does not smoke. She ws recently diagnosed with type 2 Diabetes and takes metformin    Referral placed to oncology social worker: yes   Discussed financial advocacy as well and patient would like to speak with them. Referral placed    Cancer family history includes her uncle with throat   Referral to oncology genetics: YES/NO: yes    Discussed the  Cancer Support Community and some of the programs and services offered  Discussed Breast Cancer Support group that meets monthly at [facility].  Information sent via Pathflow.    Patient has my contact information and knows she can reach out with questions.  Office hours provided.    General assessment completed.  Patient does have Pathflow set up  Pathflow message sent with our team's contact information.

## 2024-08-05 ENCOUNTER — TELEPHONE (OUTPATIENT)
Dept: FAMILY MEDICINE CLINIC | Facility: CLINIC | Age: 44
End: 2024-08-05

## 2024-08-05 ENCOUNTER — TELEPHONE (OUTPATIENT)
Dept: HEMATOLOGY ONCOLOGY | Facility: CLINIC | Age: 44
End: 2024-08-05

## 2024-08-05 ENCOUNTER — PATIENT OUTREACH (OUTPATIENT)
Dept: CASE MANAGEMENT | Facility: HOSPITAL | Age: 44
End: 2024-08-05

## 2024-08-05 NOTE — PROGRESS NOTES
[facility] received referral for patient. Patient has consult with Dr. Carrillo on 8/7/24, [facility]W will follow for plan.

## 2024-08-05 NOTE — TELEPHONE ENCOUNTER
Writer left voicemail for Pt on 8/5/24 @ 1:45 PM to touch base regarding current financial status and any insurance questions she may have. This was the 2nd attempt by writer. Writer encouraged a call back to Pt if support is needed. Writer provided contact information to Pt for future use.          Ben Stewart  Phone:[phone]  Email:[email or web address]

## 2024-08-07 ENCOUNTER — HOSPITAL ENCOUNTER (OUTPATIENT)
Facility: HOSPITAL | Age: 44
Setting detail: OUTPATIENT SURGERY
End: 2024-08-07
Attending: SURGERY | Admitting: SURGERY

## 2024-08-07 ENCOUNTER — OFFICE VISIT (OUTPATIENT)
Dept: SURGICAL ONCOLOGY | Facility: CLINIC | Age: 44
End: 2024-08-07
Payer: COMMERCIAL

## 2024-08-07 ENCOUNTER — CLINICAL SUPPORT (OUTPATIENT)
Dept: GENETICS | Facility: CLINIC | Age: 44
End: 2024-08-07
Payer: COMMERCIAL

## 2024-08-07 ENCOUNTER — PATIENT OUTREACH (OUTPATIENT)
Dept: HEMATOLOGY ONCOLOGY | Facility: CLINIC | Age: 44
End: 2024-08-07

## 2024-08-07 VITALS
SYSTOLIC BLOOD PRESSURE: 120 MMHG | TEMPERATURE: 98 F | HEART RATE: 76 BPM | BODY MASS INDEX: 31.75 KG/M2 | RESPIRATION RATE: 16 BRPM | WEIGHT: 190.6 LBS | HEIGHT: 65 IN | DIASTOLIC BLOOD PRESSURE: 78 MMHG | OXYGEN SATURATION: 97 %

## 2024-08-07 DIAGNOSIS — C50.411 MALIGNANT NEOPLASM OF UPPER-OUTER QUADRANT OF RIGHT BREAST IN FEMALE, ESTROGEN RECEPTOR NEGATIVE (HCC): Primary | ICD-10-CM

## 2024-08-07 DIAGNOSIS — Z17.1 MALIGNANT NEOPLASM OF UPPER-OUTER QUADRANT OF RIGHT BREAST IN FEMALE, ESTROGEN RECEPTOR NEGATIVE (HCC): Primary | ICD-10-CM

## 2024-08-07 DIAGNOSIS — E11.65 TYPE 2 DIABETES MELLITUS WITH HYPERGLYCEMIA, WITHOUT LONG-TERM CURRENT USE OF INSULIN (HCC): ICD-10-CM

## 2024-08-07 DIAGNOSIS — C50.911 INVASIVE DUCTAL CARCINOMA OF BREAST, FEMALE, RIGHT (HCC): ICD-10-CM

## 2024-08-07 PROCEDURE — 36415 COLL VENOUS BLD VENIPUNCTURE: CPT

## 2024-08-07 PROCEDURE — 99245 OFF/OP CONSLTJ NEW/EST HI 55: CPT | Performed by: SURGERY

## 2024-08-07 RX ORDER — TRAMADOL HYDROCHLORIDE 50 MG/1
50 TABLET ORAL EVERY 8 HOURS PRN
Qty: 9 TABLET | Refills: 0 | Status: SHIPPED | OUTPATIENT
Start: 2024-08-07

## 2024-08-07 RX ORDER — ACETAMINOPHEN 10 MG/ML
1000 INJECTION, SOLUTION INTRAVENOUS
OUTPATIENT
Start: 2024-08-07 | End: 2024-08-08

## 2024-08-07 RX ORDER — CEFAZOLIN SODIUM 2 G/50ML
2000 SOLUTION INTRAVENOUS ONCE
OUTPATIENT
Start: 2024-08-07 | End: 2024-08-07

## 2024-08-07 NOTE — PROGRESS NOTES
Breast Consultation-Surgical Oncology     [address]  [facility]  [address]  [facility] [address]    Name:  Saloni Malone  YOB: 1980  MRN:  [id number]    Assessment & Plan   Diagnoses and all orders for this visit:    Malignant neoplasm of upper-outer quadrant of right breast in female, estrogen receptor negative (HCC)  -     Incentive spirometry; Standing  -     Insert and maintain IV line; Standing  -     Void On-Call to O.R.; Standing  -     Place sequential compression device; Standing  -     Case request operating room: right jack  lumpectomy, right lymphoscintigraphy, lymphatic mapping, sentinel lymph node biopsy; Standing  -     UA w Reflex to Microscopic w Reflex to Culture; Future  -     CBC and differential; Future  -     Comprehensive metabolic panel; Future  -     HEMOGLOBIN A1C W/ EAG ESTIMATION; Future  -     EKG 12 lead; Future  -     ceFAZolin (ANCEF) IVPB (premix in dextrose) 2,000 mg 50 mL  -     NM lymphatic breast; Future  -     MAMMO CLIP NEEDLE LOC RIGHT (ALL INC); Future  -     traMADol (Ultram) 50 mg tablet; Take 1 tablet (50 mg total) by mouth every 8 (eight) hours as needed for moderate pain  -     Case request operating room: right jack  lumpectomy, right lymphoscintigraphy, lymphatic mapping, sentinel lymph node biopsy    Invasive ductal carcinoma of breast, female, right (HCC)  -     Ambulatory Referral to Surgical Oncology    Type 2 diabetes mellitus with hyperglycemia, without long-term current use of insulin (HCC)    Other orders  -     Reason for no Pharmacologic VTE Prophylaxis; Standing  -     acetaminophen (Ofirmev) injection 1,000 mg          HPI: Saloni Malone is a 43 y.o. year old female who presents with newly diagnosed carcinoma of the right breast.  She was asymptomatic referable to the breast.  She had a screening mammogram and was called back for calcifications.  She denies any  known family history of breast cancer.  She was recently diagnosed with diabetes and has not had a repeat hemoglobin A1c yet.    Surgical treatment to date consisted of not applicable.    Oncology History:    Oncology History   Malignant neoplasm of upper-outer quadrant of right breast in female, estrogen receptor negative (HCC)   2024 Biopsy    Right breast stereotactic biopsy  10 o'clock (U clip)  Invasive ductal carcinoma  Grade 2  ER <1, MS <1, HER2 3+    Concordant. Malignancy appears unifocal; calcifications occupied approximately 1.8 cm. Right axilla US has not been performed. Left breast clear.     2024 -  Cancer Staged    Staging form: Breast, AJCC 8th Edition  - Clinical stage from 2024: Stage IA (cT1, cN0, cM0, G2, ER-, MS-, HER2+) - Signed by Keeley Carrillo MD on 2024  Stage prefix: Initial diagnosis  Method of lymph node assessment: Clinical  Histologic grading system: 3 grade system           Pertinent reproductive history:  Age at menarche:    OB History               Para        Term                AB        Living             SAB        IAB        Ectopic        Multiple        Live Births   5           Obstetric Comments   Age at menarche: 11  Age at first pregnancy: 17                 Problem List:   Patient Active Problem List   Diagnosis    Hepatic steatosis    History of uterine fibroid    Chronic cough    Weight gain    Type 2 diabetes mellitus with hyperglycemia, without long-term current use of insulin (HCC)    Dizziness    Mixed hyperlipidemia    Malignant neoplasm of upper-outer quadrant of right breast in female, estrogen receptor negative (HCC)     Past Medical History:   Diagnosis Date    Diabetes mellitus (HCC)     Hyperlipidemia      Past Surgical History:   Procedure Laterality Date    BREAST BIOPSY Right 2024    Stereotactic breast biopsy     SECTION      KNEE SURGERY Right     3 surgeries    MAMMO STEREOTACTIC BREAST BIOPSY RIGHT (ALL  INC) Right 7/29/2024    TUBAL LIGATION       Family History   Problem Relation Age of Onset    Diabetes Mother     Diabetes Father     No Known Problems Sister     No Known Problems Sister     Leukemia Brother     No Known Problems Daughter     No Known Problems Daughter     No Known Problems Maternal Aunt     No Known Problems Maternal Aunt     No Known Problems Maternal Aunt     No Known Problems Maternal Aunt     No Known Problems Maternal Aunt     Throat cancer Maternal Uncle     No Known Problems Paternal Aunt     No Known Problems Paternal Aunt     No Known Problems Paternal Aunt     No Known Problems Paternal Aunt     No Known Problems Paternal Aunt     No Known Problems Maternal Grandmother     Diabetes Maternal Grandfather     Diabetes Paternal Grandmother     Cancer Paternal Grandfather         brain cancer?    Hodgkin's lymphoma Cousin      Social History     Socioeconomic History    Marital status: Single     Spouse name: Not on file    Number of children: Not on file    Years of education: Not on file    Highest education level: Not on file   Occupational History    Not on file   Tobacco Use    Smoking status: Never    Smokeless tobacco: Never   Vaping Use    Vaping status: Never Used   Substance and Sexual Activity    Alcohol use: Yes     Comment: Occasionally    Drug use: Not Currently    Sexual activity: Yes     Partners: Male   Other Topics Concern    Not on file   Social History Narrative    Not on file     Social Determinants of Health     Financial Resource Strain: Not on file   Food Insecurity: Not on file   Transportation Needs: Not on file   Physical Activity: Not on file   Stress: Not on file   Social Connections: Not on file   Intimate Partner Violence: Not on file   Housing Stability: Not on file     Current Outpatient Medications   Medication Sig Dispense Refill    atorvastatin (LIPITOR) 20 mg tablet Take 1 tablet (20 mg total) by mouth every evening 30 tablet 11    Blood Glucose  Monitoring Suppl (OneTouch Verio Reflect) w/Device KIT Check blood sugars once daily. Please substitute with appropriate alternative as covered by patient's insurance. Dx: E11.65 1 kit 0    glucose blood (OneTouch Verio) test strip Check blood sugars once daily. Please substitute with appropriate alternative as covered by patient's insurance. Dx: E11.65 100 each 3    meclizine (ANTIVERT) 25 mg tablet Take 1 tablet (25 mg total) by mouth 3 (three) times a day as needed for dizziness 30 tablet 0    metFORMIN (GLUCOPHAGE-XR) 500 mg 24 hr tablet Take 1 tablet (500 mg total) by mouth daily with dinner 30 tablet 11    OneTouch Delica Lancets 33G MISC Check blood sugars once daily. Please substitute with appropriate alternative as covered by patient's insurance. Dx: E11.65 100 each 3    traMADol (Ultram) 50 mg tablet Take 1 tablet (50 mg total) by mouth every 8 (eight) hours as needed for moderate pain 9 tablet 0     No current facility-administered medications for this visit.     No Known Allergies      The following portions of the patient's history were reviewed and updated as appropriate: allergies, current medications, past family history, past medical history, past social history, past surgical history, and problem list.    Review of Systems:  Review of Systems   Constitutional:  Positive for appetite change. Negative for fever and unexpected weight change.   HENT: Negative.  Negative for trouble swallowing.    Eyes: Negative.    Respiratory: Negative.  Negative for cough and shortness of breath.    Cardiovascular: Negative.  Negative for chest pain.   Gastrointestinal:  Positive for constipation. Negative for abdominal pain, nausea and vomiting.   Endocrine: Negative.    Genitourinary: Negative.  Negative for dysuria.   Musculoskeletal: Negative.  Negative for arthralgias and myalgias.   Skin: Negative.    Allergic/Immunologic: Negative.    Neurological: Negative.  Negative for headaches.   Hematological: Negative.   Negative for adenopathy. Does not bruise/bleed easily.   Psychiatric/Behavioral:  Positive for decreased concentration and sleep disturbance. The patient is nervous/anxious.        Physical Exam:  Physical Exam  Constitutional:       General: She is not in acute distress.     Appearance: Normal appearance. She is well-developed.   HENT:      Head: Normocephalic and atraumatic.   Cardiovascular:      Heart sounds: Normal heart sounds.   Pulmonary:      Breath sounds: Normal breath sounds.   Chest:   Breasts:     Right: Skin change (Well-healed biopsy site upper outer) present. No swelling, bleeding, inverted nipple, mass, nipple discharge or tenderness.      Left: No swelling, bleeding, inverted nipple, mass, nipple discharge, skin change or tenderness.   Abdominal:      Palpations: Abdomen is soft.   Musculoskeletal:      Right lower leg: No edema.      Left lower leg: No edema.   Lymphadenopathy:      Upper Body:      Right upper body: No supraclavicular, axillary or pectoral adenopathy.      Left upper body: No supraclavicular, axillary or pectoral adenopathy.   Neurological:      Mental Status: She is alert and oriented to person, place, and time.   Psychiatric:         Mood and Affect: Mood normal.         Laboratory:    2024 stereotactic biopsy right breast 10:00    Pathology revealed: invasive ductal carcinoma    Histologic grade: moderately differentiated     Angiolymphatic invasion:  absent    Tumor node status: Negative    Hormone receptor status: ER negative, VA negative, HER2 3+    Other studies: 2024 hemoglobin A1c was elevated at 8.8    Imagin2024 lateral 3D screening mammogram was a BI-RADS 0 secondary to calcifications upper outer right breast, left side was benign, densities of 3      2024 right diagnostic mammogram shows suspicious calcifications upper outer 10:00 spanning 1.8 cm    2024 postbiopsy mammogram is concordant, standard clip in place        Discussion/Summary:  43-year-old female who presents with newly diagnosed carcinoma of the right breast.  She was asymptomatic referable to the breast.  She had an abnormal screening mammogram which led to a call back for additional imaging and biopsy.  This reveals invasive ductal carcinoma long with a DCIS component.  The calcifications spanned 1.8 cm.  There was no mass present.  The focus of invasive carcinoma was only 5 mm on the core needle biopsy.  I therefore am recommending upfront surgery.  I reviewed these findings with her.  We discussed the multimodality treatment of breast cancer to include surgery, radiation and medical therapy.  She is interested in genetic testing.  Outside of any actionable mutations, she would be a good candidate for breast conservation.  She would like to start that process.  If there are any mutations detected on genetics, she can obviously switch gears.  She was counseled on a JU localized lumpectomy of the right breast along with lymphatic mapping and sentinel node biopsy.  She needs the Ju inserted.  She understands that radiation will be a part of her care.  She also understands that she will need to see medical oncology to discuss adjuvant therapy.  I discussed the role of chemo and anti-HER2 therapy with her.  Final recommendations will be made postoperatively by medical oncology.  All of her questions were answered.  Consent was signed today in the office.  We will schedule for surgery in the near term.  In addition, we will call her with the results of the genetic testing.

## 2024-08-07 NOTE — PROGRESS NOTES
I introduced myself to Saloni I reviewed the following:    While the majority of cancer occurs by chance, approximately 5-10% of breast cancer has an underlying genetic cause.  Genetic testing is available which can determine if there is an underlying genetic cause to your cancer.  Understanding if there is an underlying genetic cause can:  Provide your surgeon with additional information to help with surgical decisions, treatment decisions and eligibility for clinical trials.    It can determine if you have an increased risk for any additional cancers.  Help family members understand their cancer risk.       We work closely with the [facility] breast surgeons and are reaching out to see if you have interest in genetic testing. The reason we are reaching out at this time is that this result may help your surgeon determine the appropriate type of surgery (i.e. lumpectomy vs mastectomy). This test is not a requirement but can take 5-10 days to complete so we would like to start the process as soon as possible so the results are ready for your appointment with your surgeon.       If you are interested in genetic testing, I can collect your family history and initiate genetic testing for you.        Patient elected to pursue testing     Diagnosis Details:  Invasive Ductal Carcinoma, DCIS  Right  ER <1, ME <1, HER2 3+    Personal History:  Do you have a personal history of any other cancer? No  If yes type/age of diagnosis:     Family history:   Do you have Ashkenazi Zoroastrianism ancestry? No  If yes, maternal, paternal, or both?    Do you have any children? Yes  How many sons? 2  How many daughters? 2  Do any of your children have a history of cancer? No  If yes type/age of diagnosis:     Do you have any brothers or sisters? Yes  How many brothers? 2  How many sisters? 3  Are they from the same parents? No  If no how maternal/paternal half-siblings: Maternal half sister x 1, paternal half sister x 1, Paternal Half brother x  1  Do any of your brothers or sisters have a history of cancer? Yes  If yes who and the type/age of diagnosis:   Paternal Half Brother - Leukemia age 12 ()          Do you have nieces or nephews? Yes  Do any of them have a history of cancer? No  If yes type/age of diagnosis:    Does your mother have a history of cancer? No  If yes, cancer type and age of diagnosis:   Is your mother still living? Yes  Age/Age of death: 76    Thinking about your mother's family (aunts, uncles, cousins, grandparents) is there anyone with a history of cancer? Yes  If yes, list relationship, cancer type and age of diagnosis:  Maternal Uncle - Laryngeal Cancer age 58  Maternal Cousin (son of uncle listed above) - Hodgkin's Lymphoma age 6    Does your father have a history of cancer? No  If yes, cancer type and age of diagnosis:   Is your father still living? Yes  Age/age of death: 60s    Thinking about your father's family (aunts, uncles, cousins, grandparents) is there anyone with a history of cancer? No  If yes, list relationship, cancer type and age of diagnosis:      Types of Results - Positive, Negative, VUS  Positive Result - May explain personal diagnosis/family history. Can give surgeon information on treatment plan, inform future screening/management or tell a person about other possible risks. Positive results can initiate testing for other family members who may be at risk (children, siblings, etc)  Negative Result - Does not give an explanation. Surgical/treatment plan will be based on clinical presentation and will be part of discussion with surgeon. Negative result cannot be passed down to children, but they are still at elevated risk.  Uncertain Result - Common, but treated like a negative result clinically. 90% are downgraded over time.     [facility]'s billing policy   Most insurance plans cover the cost of genetic testing. The out-of-pocket cost varies due to the differences in deductibles, co-payments and  co-insurance defined by individual plans but 90% of people pay $100 or less for a genetic test     We have genetic counselors available, if you have any additional questions or would like to speak with them we can schedule you a 15 minute appointment.      Plan:  A blood kit was collected and the patient was provided information on genetic testing and the lab's billing policy.     Genetic Testing Preformed: [facility] BRCAplus STAT Panel (13 genes): JOAQUÍN, BARD1, BRCA1, BRCA2, CDH1, CHEK2, NF1, PALB2, PTEN, RAD51C, RAD51D, STK11, TP53 with reflex to [facility] CustomNext: Cancer+RNAinsight (59 genes): APC, JOAQUÍN, AXIN2, BAP1, BARD1, BMPR1A, BRCA1, BRCA2, BRIP1, CDH1, CDK4, CDKN1B, CDKN2A, CHEK2, CTNNA1, DICER1, EGLN1, EPCAM, FH, FLCN, GREM1, HOXB13, KIF1B, KIT, MAX, MEN1, MET, MITF, MLH1, MSH2, MSH3, MSH6, MUTYH, NF1, NTHL1, PALB2, PDGFRA PMS2, POLD1, POLE, POT1, PTEN, RAD51C, RAD51D, RB1, RET, SDHA, SDHAF2, SDHB, SDHC, SDHD, SMAD4, SMARCA4, STK11, TTOB600, TP53, TSC1, TSC2, VHL     Result Call Information:  I confirmed the patient's mobile number on file as the best number to call with results  I confirmed with the patient that we can leave a voicemail on the provided numbers    Initial results will take approximately 5-12 days to return     Additional results may take up to 2-3 weeks to complete once test is started.    Patient does not have any further questions, declined meeting with genetic counselor    When your results are ready, someone from the genetics team will call you, review the results, and contact your breast surgeon. You will be contacted with any type of result- positive, negative, or uncertain.

## 2024-08-07 NOTE — PROGRESS NOTES
Breast Oncology Nurse Navigator    Patient had initial consult with Dr Carrillo this morning. Met with patient offered support and answered questions when appropriate.  Patient had genetic testing following her consult and is tentatively scheduled for surgery on 08/23/24.    Patient has my contact information and knows to reach out with further questions or concerns.

## 2024-08-08 ENCOUNTER — PATIENT OUTREACH (OUTPATIENT)
Dept: HEMATOLOGY ONCOLOGY | Facility: CLINIC | Age: 44
End: 2024-08-08

## 2024-08-08 NOTE — PROGRESS NOTES
Patient Navigation attempted to outreach patient after her Surgical Oncology consult to complete the Distress Thermometer.    A voicemail was left stating a reason for my call and my contact information was provided. I requested a return call at patient's earliest convenience.

## 2024-08-09 ENCOUNTER — PATIENT OUTREACH (OUTPATIENT)
Dept: HEMATOLOGY ONCOLOGY | Facility: CLINIC | Age: 44
End: 2024-08-09

## 2024-08-09 ENCOUNTER — TELEPHONE (OUTPATIENT)
Dept: MAMMOGRAPHY | Facility: CLINIC | Age: 44
End: 2024-08-09

## 2024-08-09 ENCOUNTER — PATIENT OUTREACH (OUTPATIENT)
Dept: CASE MANAGEMENT | Facility: HOSPITAL | Age: 44
End: 2024-08-09

## 2024-08-09 NOTE — TELEPHONE ENCOUNTER
Called patient on 8/8/24 and 8/9/24 to schedule jack  placement.  Left message asking patient to return call.

## 2024-08-09 NOTE — PROGRESS NOTES
spoke with patient  regarding recommendation for;    __x___ RIGHT ______LEFT      _____Ultrasound guided  _____x_Stereotactic jack placement      __X___Verbalized understanding.      Blood thinners:  No: ___x__ Yes: ______ What:                 Biopsy teaching sheet given:  Yes: ___X___ No: ________    Pt given contact information and adv to call with any questions/needs    Patient advised to arrive at .1400.. for a .1430.. appointment

## 2024-08-09 NOTE — PROGRESS NOTES
OSW completed chart review. Patient had consult with Dr. Carrillo and surgery is scheduled for 8/23/24. OSW will outreach.

## 2024-08-09 NOTE — PROGRESS NOTES
Patient Navigation attempted to outreach patient for the second time after her Surgical Oncology consult to complete the Distress Thermometer.    A voicemail was left stating a reason for my call and my contact information was provided. I requested a return call at patient's earliest convenience.

## 2024-08-12 ENCOUNTER — PATIENT OUTREACH (OUTPATIENT)
Dept: HEMATOLOGY ONCOLOGY | Facility: CLINIC | Age: 44
End: 2024-08-12

## 2024-08-12 NOTE — PROGRESS NOTES
Patient Navigation attempted to outreach patient for the third time after her Surgical Oncology consult to complete the Distress Thermometer.     Pt's phone just keeps ringing, no voicemail option.   Will try and outreach again in a few days.

## 2024-08-13 ENCOUNTER — APPOINTMENT (OUTPATIENT)
Dept: LAB | Facility: HOSPITAL | Age: 44
End: 2024-08-13
Payer: COMMERCIAL

## 2024-08-13 ENCOUNTER — LAB (OUTPATIENT)
Dept: LAB | Facility: HOSPITAL | Age: 44
End: 2024-08-13
Payer: COMMERCIAL

## 2024-08-13 DIAGNOSIS — C50.411 MALIGNANT NEOPLASM OF UPPER-OUTER QUADRANT OF RIGHT BREAST IN FEMALE, ESTROGEN RECEPTOR NEGATIVE (HCC): ICD-10-CM

## 2024-08-13 DIAGNOSIS — Z17.1 MALIGNANT NEOPLASM OF UPPER-OUTER QUADRANT OF RIGHT BREAST IN FEMALE, ESTROGEN RECEPTOR NEGATIVE (HCC): ICD-10-CM

## 2024-08-13 DIAGNOSIS — E11.65 TYPE 2 DIABETES MELLITUS WITH HYPERGLYCEMIA, WITHOUT LONG-TERM CURRENT USE OF INSULIN (HCC): ICD-10-CM

## 2024-08-13 LAB
ALBUMIN SERPL BCG-MCNC: 3.8 G/DL (ref 3.5–5)
ALP SERPL-CCNC: 96 U/L (ref 34–104)
ALT SERPL W P-5'-P-CCNC: 30 U/L (ref 7–52)
ANION GAP SERPL CALCULATED.3IONS-SCNC: 5 MMOL/L (ref 4–13)
AST SERPL W P-5'-P-CCNC: 26 U/L (ref 13–39)
ATRIAL RATE: 56 BPM
BACTERIA UR QL AUTO: ABNORMAL /HPF
BASOPHILS # BLD AUTO: 0.01 THOUSANDS/ÂΜL (ref 0–0.1)
BASOPHILS NFR BLD AUTO: 0 % (ref 0–1)
BILIRUB SERPL-MCNC: 0.49 MG/DL (ref 0.2–1)
BILIRUB UR QL STRIP: NEGATIVE
BUN SERPL-MCNC: 10 MG/DL (ref 5–25)
CALCIUM SERPL-MCNC: 8.8 MG/DL (ref 8.4–10.2)
CHLORIDE SERPL-SCNC: 103 MMOL/L (ref 96–108)
CHOLEST SERPL-MCNC: 112 MG/DL
CLARITY UR: ABNORMAL
CO2 SERPL-SCNC: 29 MMOL/L (ref 21–32)
COLOR UR: YELLOW
CREAT SERPL-MCNC: 0.48 MG/DL (ref 0.6–1.3)
EOSINOPHIL # BLD AUTO: 0.1 THOUSAND/ÂΜL (ref 0–0.61)
EOSINOPHIL NFR BLD AUTO: 1 % (ref 0–6)
ERYTHROCYTE [DISTWIDTH] IN BLOOD BY AUTOMATED COUNT: 12.6 % (ref 11.6–15.1)
EST. AVERAGE GLUCOSE BLD GHB EST-MCNC: 209 MG/DL
GFR SERPL CREATININE-BSD FRML MDRD: 118 ML/MIN/1.73SQ M
GLUCOSE P FAST SERPL-MCNC: 177 MG/DL (ref 65–99)
GLUCOSE UR STRIP-MCNC: ABNORMAL MG/DL
HBA1C MFR BLD: 8.9 %
HCT VFR BLD AUTO: 38.2 % (ref 34.8–46.1)
HDLC SERPL-MCNC: 35 MG/DL
HGB BLD-MCNC: 12.6 G/DL (ref 11.5–15.4)
HGB UR QL STRIP.AUTO: ABNORMAL
IMM GRANULOCYTES # BLD AUTO: 0.03 THOUSAND/UL (ref 0–0.2)
IMM GRANULOCYTES NFR BLD AUTO: 0 % (ref 0–2)
KETONES UR STRIP-MCNC: NEGATIVE MG/DL
LDLC SERPL CALC-MCNC: 53 MG/DL (ref 0–100)
LEUKOCYTE ESTERASE UR QL STRIP: ABNORMAL
LYMPHOCYTES # BLD AUTO: 2.83 THOUSANDS/ÂΜL (ref 0.6–4.47)
LYMPHOCYTES NFR BLD AUTO: 33 % (ref 14–44)
MCH RBC QN AUTO: 28 PG (ref 26.8–34.3)
MCHC RBC AUTO-ENTMCNC: 33 G/DL (ref 31.4–37.4)
MCV RBC AUTO: 85 FL (ref 82–98)
MONOCYTES # BLD AUTO: 0.42 THOUSAND/ÂΜL (ref 0.17–1.22)
MONOCYTES NFR BLD AUTO: 5 % (ref 4–12)
MUCOUS THREADS UR QL AUTO: ABNORMAL
NEUTROPHILS # BLD AUTO: 5.13 THOUSANDS/ÂΜL (ref 1.85–7.62)
NEUTS SEG NFR BLD AUTO: 61 % (ref 43–75)
NITRITE UR QL STRIP: NEGATIVE
NON-SQ EPI CELLS URNS QL MICRO: ABNORMAL /HPF
NRBC BLD AUTO-RTO: 0 /100 WBCS
P AXIS: -8 DEGREES
PH UR STRIP.AUTO: 5.5 [PH]
PLATELET # BLD AUTO: 270 THOUSANDS/UL (ref 149–390)
PMV BLD AUTO: 11.8 FL (ref 8.9–12.7)
POTASSIUM SERPL-SCNC: 4.1 MMOL/L (ref 3.5–5.3)
PR INTERVAL: 134 MS
PROT SERPL-MCNC: 7.4 G/DL (ref 6.4–8.4)
PROT UR STRIP-MCNC: NEGATIVE MG/DL
QRS AXIS: -14 DEGREES
QRSD INTERVAL: 78 MS
QT INTERVAL: 444 MS
QTC INTERVAL: 428 MS
RBC # BLD AUTO: 4.5 MILLION/UL (ref 3.81–5.12)
RBC #/AREA URNS AUTO: ABNORMAL /HPF
SODIUM SERPL-SCNC: 137 MMOL/L (ref 135–147)
SP GR UR STRIP.AUTO: 1.02 (ref 1–1.03)
T WAVE AXIS: -6 DEGREES
TRIGL SERPL-MCNC: 121 MG/DL
UROBILINOGEN UR STRIP-ACNC: <2 MG/DL
VENTRICULAR RATE: 56 BPM
WBC # BLD AUTO: 8.52 THOUSAND/UL (ref 4.31–10.16)
WBC #/AREA URNS AUTO: ABNORMAL /HPF

## 2024-08-13 PROCEDURE — 36415 COLL VENOUS BLD VENIPUNCTURE: CPT

## 2024-08-13 PROCEDURE — 81001 URINALYSIS AUTO W/SCOPE: CPT

## 2024-08-13 PROCEDURE — 93010 ELECTROCARDIOGRAM REPORT: CPT | Performed by: INTERNAL MEDICINE

## 2024-08-13 PROCEDURE — 83036 HEMOGLOBIN GLYCOSYLATED A1C: CPT

## 2024-08-13 PROCEDURE — 93005 ELECTROCARDIOGRAM TRACING: CPT

## 2024-08-13 PROCEDURE — 80061 LIPID PANEL: CPT

## 2024-08-13 PROCEDURE — 80053 COMPREHEN METABOLIC PANEL: CPT

## 2024-08-13 PROCEDURE — 85025 COMPLETE CBC W/AUTO DIFF WBC: CPT

## 2024-08-14 ENCOUNTER — TELEPHONE (OUTPATIENT)
Dept: FAMILY MEDICINE CLINIC | Facility: CLINIC | Age: 44
End: 2024-08-14

## 2024-08-14 ENCOUNTER — TELEPHONE (OUTPATIENT)
Dept: GENETICS | Facility: CLINIC | Age: 44
End: 2024-08-14

## 2024-08-14 DIAGNOSIS — E11.65 TYPE 2 DIABETES MELLITUS WITH HYPERGLYCEMIA, WITHOUT LONG-TERM CURRENT USE OF INSULIN (HCC): ICD-10-CM

## 2024-08-14 DIAGNOSIS — E11.65 TYPE 2 DIABETES MELLITUS WITH HYPERGLYCEMIA, WITHOUT LONG-TERM CURRENT USE OF INSULIN (HCC): Primary | ICD-10-CM

## 2024-08-14 RX ORDER — BLOOD SUGAR DIAGNOSTIC
STRIP MISCELLANEOUS
Qty: 100 EACH | Refills: 3 | Status: SHIPPED | OUTPATIENT
Start: 2024-08-14 | End: 2024-08-16 | Stop reason: SDUPTHER

## 2024-08-14 RX ORDER — PEN NEEDLE, DIABETIC 32GX 5/32"
NEEDLE, DISPOSABLE MISCELLANEOUS
Qty: 100 EACH | Refills: 3 | Status: SHIPPED | OUTPATIENT
Start: 2024-08-14

## 2024-08-14 NOTE — TELEPHONE ENCOUNTER
STAT Genetic Test Result    Summary:    I left a message with Saloni to review the result of her STAT genetic test.  I encouraged her to call our office back at [phone] to discuss this result further.      Initial Test: [facility] BRCAplus STAT Panel (13 genes): JOAQUÍN, BARD1, BRCA1, BRCA2, CDH1, CHEK2, NF1, PALB2, PTEN, RAD51C, RAD51D, STK11, TP53     Result: Negative - No Clinically Significant Variants Detected      Assessment:     Negative   A negative result significantly reduces the likelihood that Saloni has a hereditary cancer syndrome related to the high-risk breast cancer genes listed above.      This result does not have surgical implications and surgical options should be discussed with her healthcare provider.        Additional Testing:  [facility] CustomNext: Cancer+RNAinsight (59 genes): APC, JOAQUÍN, AXIN2, BAP1, BARD1, BMPR1A, BRCA1, BRCA2, BRIP1, CDH1, CDK4, CDKN1B, CDKN2A, CHEK2, CTNNA1, DICER1, EGLN1, EPCAM, FH, FLCN, GREM1, HOXB13, KIF1B, KIT, MAX, MEN1, MET, MITF, MLH1, MSH2, MSH3, MSH6, MUTYH, NF1, NTHL1, PALB2, PDGFRA PMS2, POLD1, POLE, POT1, PTEN, RAD51C, RAD51D, RB1, RET, SDHA, SDHAF2, SDHB, SDHC, SDHD, SMAD4, SMARCA4, STK11, TUQH851, TP53, TSC1, TSC2, VHL test is pending.  We will contact Saloni once results are available.  Additional recommendations for surveillance/medical management will be made upon final genetic test result.         Saloni's breast surgeon Dr. Carrillo was made aware of this test result.

## 2024-08-14 NOTE — TELEPHONE ENCOUNTER
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  Patient wants to get her breast surgery done as soon as possible and is willing to do what she needs to do so we will start Tresiba 10 units daily starting tomorrow after her colonoscopy and back to regular eating habits.  She will then take fasting sugars and 2-hour postprandial sugars and let me know the results through EPShart on Monday.  We will titrate according to the results.  I will let Dr. Carrillo know our plan.    ----- Message from Elisabet SOLANO sent at 8/14/2024 10:08 AM EDT -----  It looks like this patient's surgery is scheduled for 8/23, which is just over 1 week away. I would absolutely start her on basal insulin because it is going to work the fastest. Also, I would clarify with the surgeon if there is a blood sugar goal that she would find acceptable vs. an A1c because even if you lower her blood sugars, an A1c will not likely reflect a change in 1 week.     Her insurance prefers Toujeo/Tresiba. I would start at 20 units SQ daily (0.2 units/kg) and provide instructions for home titration. This is a tall order unfortunately  ----- Message -----  From: Sylwia Cates PA-C  Sent: 8/14/2024   8:38 AM EDT  To: Elisabet Bhatti, Pharmacist    Good Ping Bhandari.  The attached patient came to me and was diagnosed with uncontrolled diabetes and then shortly thereafter was diagnosed with breast cancer.  She needs surgery but the surgeon wants her A1c to be down.  I only started patient on 500 of metformin as she is just starting to change her lifestyle and go to classes etc. but apparently there is no time and we need to get the patient's A1c down quickly.  I am wondering if you would recommend maxing out on the metformin although I do not know if that will work as quickly as the surgeon wants or if we should temporarily add in basal insulin to get patient to  goal?  Would appreciate your opinion.  Thank you.  ----- Message -----  From: Keeley Carrillo MD  Sent: 8/13/2024  10:32 AM EDT  To: Karla Colmenares MA; Sylwia Cates PA-C; #    Pt's hgba1c has trended up a touch but essentially the same as the last, now 8.9. Target for surgery is under 8. Looks like she has only been on the metformin for a little over a month now. Typically we need some new intervention and a 2 week delay for surgery when hgba1c is over 8. Do you want to handle this or should I send her to endocrinology? Thanks

## 2024-08-15 ENCOUNTER — DOCUMENTATION (OUTPATIENT)
Dept: HEMATOLOGY ONCOLOGY | Facility: CLINIC | Age: 44
End: 2024-08-15

## 2024-08-15 ENCOUNTER — ANESTHESIA (OUTPATIENT)
Dept: GASTROENTEROLOGY | Facility: MEDICAL CENTER | Age: 44
End: 2024-08-15

## 2024-08-15 ENCOUNTER — ANESTHESIA EVENT (OUTPATIENT)
Dept: GASTROENTEROLOGY | Facility: MEDICAL CENTER | Age: 44
End: 2024-08-15

## 2024-08-15 ENCOUNTER — HOSPITAL ENCOUNTER (OUTPATIENT)
Dept: GASTROENTEROLOGY | Facility: MEDICAL CENTER | Age: 44
Setting detail: OUTPATIENT SURGERY
End: 2024-08-15
Payer: COMMERCIAL

## 2024-08-15 VITALS
RESPIRATION RATE: 16 BRPM | DIASTOLIC BLOOD PRESSURE: 55 MMHG | OXYGEN SATURATION: 100 % | SYSTOLIC BLOOD PRESSURE: 101 MMHG | HEART RATE: 63 BPM | BODY MASS INDEX: 31.65 KG/M2 | HEIGHT: 65 IN | WEIGHT: 190 LBS | TEMPERATURE: 97.2 F

## 2024-08-15 DIAGNOSIS — Z12.11 SCREEN FOR COLON CANCER: ICD-10-CM

## 2024-08-15 LAB
EXT PREGNANCY TEST URINE: NEGATIVE
EXT. CONTROL: NORMAL
GLUCOSE SERPL-MCNC: 142 MG/DL (ref 65–140)

## 2024-08-15 PROCEDURE — 88305 TISSUE EXAM BY PATHOLOGIST: CPT | Performed by: PATHOLOGY

## 2024-08-15 PROCEDURE — 82948 REAGENT STRIP/BLOOD GLUCOSE: CPT

## 2024-08-15 PROCEDURE — 81025 URINE PREGNANCY TEST: CPT | Performed by: GENERAL PRACTICE

## 2024-08-15 PROCEDURE — 45385 COLONOSCOPY W/LESION REMOVAL: CPT | Performed by: INTERNAL MEDICINE

## 2024-08-15 RX ORDER — SIMETHICONE 40MG/0.6ML
SUSPENSION, DROPS(FINAL DOSAGE FORM)(ML) ORAL AS NEEDED
Status: COMPLETED | OUTPATIENT
Start: 2024-08-15 | End: 2024-08-15

## 2024-08-15 RX ORDER — SODIUM CHLORIDE 9 MG/ML
INJECTION, SOLUTION INTRAVENOUS CONTINUOUS PRN
Status: DISCONTINUED | OUTPATIENT
Start: 2024-08-15 | End: 2024-08-15

## 2024-08-15 RX ORDER — SODIUM CHLORIDE 9 MG/ML
125 INJECTION, SOLUTION INTRAVENOUS CONTINUOUS
Status: DISCONTINUED | OUTPATIENT
Start: 2024-08-15 | End: 2024-08-19 | Stop reason: HOSPADM

## 2024-08-15 RX ORDER — ONDANSETRON 2 MG/ML
4 INJECTION INTRAMUSCULAR; INTRAVENOUS ONCE AS NEEDED
Status: DISCONTINUED | OUTPATIENT
Start: 2024-08-15 | End: 2024-08-19 | Stop reason: HOSPADM

## 2024-08-15 RX ORDER — PROPOFOL 10 MG/ML
INJECTION, EMULSION INTRAVENOUS AS NEEDED
Status: DISCONTINUED | OUTPATIENT
Start: 2024-08-15 | End: 2024-08-15

## 2024-08-15 RX ADMIN — SODIUM CHLORIDE: 0.9 INJECTION, SOLUTION INTRAVENOUS at 07:22

## 2024-08-15 RX ADMIN — PROPOFOL 120 MG: 10 INJECTION, EMULSION INTRAVENOUS at 07:26

## 2024-08-15 RX ADMIN — PROPOFOL 50 MG: 10 INJECTION, EMULSION INTRAVENOUS at 07:33

## 2024-08-15 RX ADMIN — PROPOFOL 50 MG: 10 INJECTION, EMULSION INTRAVENOUS at 07:28

## 2024-08-15 RX ADMIN — Medication 40 MG: at 07:34

## 2024-08-15 RX ADMIN — PROPOFOL 50 MG: 10 INJECTION, EMULSION INTRAVENOUS at 07:32

## 2024-08-15 RX ADMIN — SODIUM CHLORIDE 125 ML/HR: 0.9 INJECTION, SOLUTION INTRAVENOUS at 07:21

## 2024-08-15 RX ADMIN — PROPOFOL 30 MG: 10 INJECTION, EMULSION INTRAVENOUS at 07:41

## 2024-08-15 RX ADMIN — PROPOFOL 50 MG: 10 INJECTION, EMULSION INTRAVENOUS at 07:30

## 2024-08-15 NOTE — ANESTHESIA PREPROCEDURE EVALUATION
Procedure:  COLONOSCOPY    Relevant Problems   ANESTHESIA (within normal limits)      CARDIO   (+) Mixed hyperlipidemia   (-) Angina at rest   (-) Angina of effort   (-) Chest pain   (-) ALVARADO (dyspnea on exertion)      ENDO   (+) Type 2 diabetes mellitus with hyperglycemia, without long-term current use of insulin (HCC)      GI/HEPATIC   (+) Hepatic steatosis   (-) Chronic liver disease      /RENAL   (-) Chronic kidney disease      GYN   (+) Malignant neoplasm of upper-outer quadrant of right breast in female, estrogen receptor negative (HCC)      NEURO/PSYCH   (-) CVA (cerebral vascular accident) (HCC)   (-) Seizures (HCC)      PULMONARY   (-) Asthma   (-) Chronic obstructive pulmonary disease (HCC)   (-) Sleep apnea        Physical Exam    Airway    Mallampati score: II  TM Distance: >3 FB  Neck ROM: full     Dental   No notable dental hx     Cardiovascular      Pulmonary      Other Findings  post-pubertal.      Anesthesia Plan  ASA Score- 2     Anesthesia Type- IV sedation with anesthesia with ASA Monitors.         Additional Monitors:     Airway Plan:            Plan Factors-Exercise tolerance (METS): >4 METS.    Chart reviewed.   Existing labs reviewed.                   Induction- intravenous.    Postoperative Plan-         Informed Consent- Anesthetic plan and risks discussed with patient.  I personally reviewed this patient with the CRNA. Discussed and agreed on the Anesthesia Plan with the CRNA..

## 2024-08-15 NOTE — H&P
H&P EXAM - Outpatient Endoscopy   Saloni Malone 43 y.o. female MRN: [id number]    [facility] GI LAB PRE/PST   Encounter: [id number]        History and Physical -  [facility]  Saloni Malone 43 y.o. female MRN: [id number]                  HPI: Saloni Malone is a 43 y.o. year old female who presents for colon cancer screening      REVIEW OF SYSTEMS: Per the HPI, and otherwise unremarkable.    Historical Information   Past Medical History:   Diagnosis Date    Breast cancer (HCC)     right    Diabetes mellitus (HCC)     Hyperlipidemia      Past Surgical History:   Procedure Laterality Date    BREAST BIOPSY Right 2024    Stereotactic breast biopsy     SECTION      KNEE SURGERY Right     3 surgeries    MAMMO STEREOTACTIC BREAST BIOPSY RIGHT (ALL INC) Right 2024    TUBAL LIGATION       Social History   Social History     Substance and Sexual Activity   Alcohol Use Yes    Comment: Occasionally     Social History     Substance and Sexual Activity   Drug Use Not Currently     Social History     Tobacco Use   Smoking Status Never   Smokeless Tobacco Never     Family History   Problem Relation Age of Onset    Diabetes Mother     Diabetes Father     No Known Problems Sister     No Known Problems Sister     Leukemia Brother     No Known Problems Daughter     No Known Problems Daughter     No Known Problems Maternal Aunt     No Known Problems Maternal Aunt     No Known Problems Maternal Aunt     No Known Problems Maternal Aunt     No Known Problems Maternal Aunt     Throat cancer Maternal Uncle     No Known Problems Paternal Aunt     No Known Problems Paternal Aunt     No Known Problems Paternal Aunt     No Known Problems Paternal Aunt     No Known Problems Paternal Aunt     No Known Problems Maternal Grandmother     Diabetes Maternal Grandfather     Diabetes Paternal Grandmother     Cancer Paternal Grandfather         brain cancer?    Hodgkin's  "lymphoma Cousin        Meds/Allergies     Not in a hospital admission.    No Known Allergies    Objective     /65   Pulse 62   Temp (!) 97.1 °F (36.2 °C) (Temporal)   Resp 16   Ht 5' 4.5\" (1.638 m)   Wt 86.2 kg (190 lb)   LMP 08/01/2024 (Approximate)   SpO2 97%   BMI 32.11 kg/m²       PHYSICAL EXAM    Gen: NAD  CV: RRR  CHEST: Clear  ABD: soft, NT/ND  EXT: no edema      ASSESSMENT/PLAN:  This is a 43 y.o. year old female here for colonoscopy, and she is stable and optimized for her procedure.            "

## 2024-08-15 NOTE — ANESTHESIA POSTPROCEDURE EVALUATION
Post-Op Assessment Note    CV Status:  Stable  Pain Score: 0    Pain management: adequate       Mental Status:  Arousable   Hydration Status:  Euvolemic   PONV Controlled:  Controlled   Airway Patency:  Patent     Post Op Vitals Reviewed: Yes    No anethesia notable event occurred.    Staff: CRNA               BP 97/52 (08/15/24 0749)    Temp (!) 97.2 °F (36.2 °C) (08/15/24 0749)    Pulse 58 (08/15/24 0749)   Resp 19 (08/15/24 0749)    SpO2 95 % (08/15/24 0749)

## 2024-08-15 NOTE — PROGRESS NOTES
Patient NavigationPatient Navigation    I have attempted to outreach Pt on three separate dates to review any barriers's to care and to complete the Distress Thermometer but have had to leave a voice message requesting a return call.  Since Pt has not returned any of my messages Patient Navigation will no longer try to outreach.  My contact information was provided if they should decide to call.

## 2024-08-16 ENCOUNTER — HOSPITAL ENCOUNTER (OUTPATIENT)
Dept: MAMMOGRAPHY | Facility: CLINIC | Age: 44
Discharge: HOME | End: 2024-08-16

## 2024-08-16 ENCOUNTER — PATIENT OUTREACH (OUTPATIENT)
Dept: CASE MANAGEMENT | Facility: HOSPITAL | Age: 44
End: 2024-08-16

## 2024-08-16 ENCOUNTER — TELEPHONE (OUTPATIENT)
Dept: SURGICAL ONCOLOGY | Facility: CLINIC | Age: 44
End: 2024-08-16

## 2024-08-16 ENCOUNTER — TELEPHONE (OUTPATIENT)
Dept: GENETICS | Facility: CLINIC | Age: 44
End: 2024-08-16

## 2024-08-16 VITALS — HEART RATE: 87 BPM | SYSTOLIC BLOOD PRESSURE: 129 MMHG | DIASTOLIC BLOOD PRESSURE: 84 MMHG

## 2024-08-16 DIAGNOSIS — E11.65 TYPE 2 DIABETES MELLITUS WITH HYPERGLYCEMIA, WITHOUT LONG-TERM CURRENT USE OF INSULIN (HCC): ICD-10-CM

## 2024-08-16 DIAGNOSIS — Z17.1 MALIGNANT NEOPLASM OF UPPER-OUTER QUADRANT OF RIGHT BREAST IN FEMALE, ESTROGEN RECEPTOR NEGATIVE (HCC): ICD-10-CM

## 2024-08-16 DIAGNOSIS — C50.411 MALIGNANT NEOPLASM OF UPPER-OUTER QUADRANT OF RIGHT BREAST IN FEMALE, ESTROGEN RECEPTOR NEGATIVE (HCC): ICD-10-CM

## 2024-08-16 RX ORDER — BLOOD SUGAR DIAGNOSTIC
STRIP MISCELLANEOUS
Qty: 300 EACH | Refills: 3 | Status: SHIPPED | OUTPATIENT
Start: 2024-08-16

## 2024-08-16 RX ORDER — LIDOCAINE HYDROCHLORIDE 10 MG/ML
5 INJECTION, SOLUTION EPIDURAL; INFILTRATION; INTRACAUDAL; PERINEURAL ONCE
Status: DISCONTINUED | OUTPATIENT
Start: 2024-08-16 | End: 2024-08-17 | Stop reason: HOSPADM

## 2024-08-16 RX ORDER — LANCETS 33 GAUGE
EACH MISCELLANEOUS
Qty: 300 EACH | Refills: 3 | Status: SHIPPED | OUTPATIENT
Start: 2024-08-16

## 2024-08-16 NOTE — TELEPHONE ENCOUNTER
"The patient returned the phone call. She stated that she did a \"a lot of back and forth\" with her family regarding her surgical decision and has decided to proceed with a bilateral mastectomy instead of a lumpectomy. Explained that the next step was for her to see Dr. Carrillo in the office for another pre-op appointment/discussion. The patient was agreeable to this and accepted an appointment on 8/19 at 10:00. She verified appointment details and was appreciative of the conversation. The patient is aware that her surgery on 8/23 with Dr. Carrillo has been cancelled.  "

## 2024-08-16 NOTE — TELEPHONE ENCOUNTER
Genetic Test Result Note    Summary:    Result Disclosure:   Today I left a voicemail for Saloni reviewing the results of her genetic test for hereditary cancer. She underwent genetic testing through our program on 8/7/2024 due to her recent diagnosis of breast cancer. I encouraged her to call [phone] to discuss this result further.  A copy of this consult note and test result will be shared with the patient.    A separate report of her STAT genetic test results were disclosed to her on 8/14/2024. This result includes new genes and does not change her initial genetic test result.     Test Performed: [employer] BRCAplus STAT Panel (13 genes): JOAQUÍN, BARD1, BRCA1, BRCA2, CDH1, CHEK2, NF1, PALB2, PTEN, RAD51C, RAD51D, STK11, TP53 with reflex to [employer] CustomNext: Cancer+RNAinsight (59 genes): APC, JOAQUÍN, AXIN2, BAP1, BARD1, BMPR1A, BRCA1, BRCA2, BRIP1, CDH1, CDK4, CDKN1B, CDKN2A, CHEK2, CTNNA1, DICER1, EGLN1, EPCAM, FH, FLCN, GREM1, HOXB13, KIF1B, KIT, MAX, MEN1, MET, MITF, MLH1, MSH2, MSH3, MSH6, MUTYH, NF1, NTHL1, PALB2, PDGFRA PMS2, POLD1, POLE, POT1, PTEN, RAD51C, RAD51D, RB1, RET, SDHA, SDHAF2, SDHB, SDHC, SDHD, SMAD4, SMARCA4, STK11, WIHU920, TP53, TSC1, TSC2, VHL     Result: Negative - No Clinically Significant Variants Detected     Assessment:   A negative result significantly reduces the likelihood that Saloni has a hereditary cancer syndrome. However, this testing is unable to completely rule out the presence of hereditary cancer. It remains possible that:  There is a variant in an area of a gene which was not tested or there is a variant not detectable due to technical limitations of this test.     There is a variant in another gene that was not included in this test or in a gene not known to be linked to cancer or tumors.   A family member has a genetic variant that the patient did not inherit.   The cancer in the family is sporadic and is related to non-hereditary factors.     Risks and Testing for Family  Members:  Saloni was made aware that all of her first-degree relatives are at increased risk to develop breast cancer based on her recent diagnosis. We recommend that her first-degree relatives make their healthcare providers aware of the family history and discuss their options for screening and risk-reduction.    At this time we do not recommend testing for Saloni 's children based on her negative test result.  Saloni 's children still need to consider the history of cancer on the other side of their family when determining their risks.     If Saloni has any affected family members with a cancer diagnosis, especially at a young age, they may still consider genetic testing. Relatives who wish to pursue genetic testing can reach out to the Cancer Risk and Genetics Program at [phone] to schedule an appointment or visit [email or web address] to identify a local genetic counselor.         Plan:     Negative Result: This result does not have surgical implications and surgical options should be discussed with her healthcare provider. Saloni's breast surgeon, Dr. Carrillo will be made aware of her results

## 2024-08-16 NOTE — TELEPHONE ENCOUNTER
Called the patient to discuss a message received that she is interested in changing her surgery type. There was no answer so a message was left with the hope line number provided. When the patient returns the call, she should be transferred to [place].

## 2024-08-19 ENCOUNTER — TELEPHONE (OUTPATIENT)
Age: 44
End: 2024-08-19

## 2024-08-19 ENCOUNTER — OFFICE VISIT (OUTPATIENT)
Dept: SURGICAL ONCOLOGY | Facility: CLINIC | Age: 44
End: 2024-08-19
Payer: COMMERCIAL

## 2024-08-19 VITALS
WEIGHT: 189.4 LBS | BODY MASS INDEX: 31.56 KG/M2 | SYSTOLIC BLOOD PRESSURE: 122 MMHG | DIASTOLIC BLOOD PRESSURE: 76 MMHG | HEIGHT: 65 IN | TEMPERATURE: 97.7 F | RESPIRATION RATE: 18 BRPM | HEART RATE: 72 BPM

## 2024-08-19 DIAGNOSIS — Z17.1 MALIGNANT NEOPLASM OF UPPER-OUTER QUADRANT OF RIGHT BREAST IN FEMALE, ESTROGEN RECEPTOR NEGATIVE (HCC): Primary | ICD-10-CM

## 2024-08-19 DIAGNOSIS — E11.65 TYPE 2 DIABETES MELLITUS WITH HYPERGLYCEMIA, WITHOUT LONG-TERM CURRENT USE OF INSULIN (HCC): ICD-10-CM

## 2024-08-19 DIAGNOSIS — Z13.71 BRCA GENE MUTATION NEGATIVE: ICD-10-CM

## 2024-08-19 DIAGNOSIS — C50.411 MALIGNANT NEOPLASM OF UPPER-OUTER QUADRANT OF RIGHT BREAST IN FEMALE, ESTROGEN RECEPTOR NEGATIVE (HCC): Primary | ICD-10-CM

## 2024-08-19 PROCEDURE — 99214 OFFICE O/P EST MOD 30 MIN: CPT | Performed by: SURGERY

## 2024-08-19 PROCEDURE — 88305 TISSUE EXAM BY PATHOLOGIST: CPT | Performed by: PATHOLOGY

## 2024-08-19 RX ORDER — CEFAZOLIN SODIUM 2 G/50ML
2000 SOLUTION INTRAVENOUS ONCE
OUTPATIENT
Start: 2024-08-19 | End: 2024-08-19

## 2024-08-19 RX ORDER — ENOXAPARIN SODIUM 100 MG/ML
40 INJECTION SUBCUTANEOUS ONCE
OUTPATIENT
Start: 2024-08-19 | End: 2024-08-19

## 2024-08-19 RX ORDER — ACETAMINOPHEN 10 MG/ML
1000 INJECTION, SOLUTION INTRAVENOUS
OUTPATIENT
Start: 2024-08-19 | End: 2024-08-20

## 2024-08-19 NOTE — TELEPHONE ENCOUNTER
Patient returning call asking for Trinidad RODRIGEZ to schedule consult.      Was at cancer [facility] with Dr Carrillo for double mastectomy and they want her to meet with Plastics for a consult before they do the surgery

## 2024-08-19 NOTE — TELEPHONE ENCOUNTER
Pt called back to speak with Trinidad about scheduling a consult    Please call pt back, twice if it does not connect the first time, she also works for [employer] and is on DND.    [phone]

## 2024-08-19 NOTE — PROGRESS NOTES
Surgical Oncology Follow Up       [address]  [facility]  [address]  [facility] [address]    Saloni Malone  1980  [id number]  [address]  [facility]  [address] [place] [address]    Chief Complaint   Patient presents with    Pre-op Exam       Assessment & Plan   Diagnoses and all orders for this visit:    Malignant neoplasm of upper-outer quadrant of right breast in female, estrogen receptor negative (HCC)  -     Case request operating room: RIGHT MASTECTOMY WITH LYMPHOSCINTIGRAPHY, LYMPHATIC MAPPING WITH SENINEL LYMPH NODE BIOPSY, LEFT PROPHYLACTIC MASTECTOMY SIMPLE, DISSECTION LYMPH NODE AXILLARY; Standing  -     UA w Reflex to Microscopic w Reflex to Culture; Future  -     Comprehensive metabolic panel; Future  -     Case request operating room: RIGHT MASTECTOMY WITH LYMPHOSCINTIGRAPHY, LYMPHATIC MAPPING WITH SENINEL LYMPH NODE BIOPSY, LEFT PROPHYLACTIC MASTECTOMY SIMPLE, DISSECTION LYMPH NODE AXILLARY  -     NM lymphatic breast; Future  -     Ambulatory Referral to Plastic Surgery; Future    Type 2 diabetes mellitus with hyperglycemia, without long-term current use of insulin (HCC)    BRCA gene mutation negative    Other orders  -     Incentive spirometry; Standing  -     Insert and maintain IV line; Standing  -     Void On-Call to O.R.; Standing  -     Place sequential compression device; Standing  -     ceFAZolin (ANCEF) IVPB (premix in dextrose) 2,000 mg 50 mL  -     enoxaparin (LOVENOX) subcutaneous injection 40 mg  -     acetaminophen (Ofirmev) injection 1,000 mg        Advance Care Planning/Advance Directives:  Discussed disease status, cancer treatment plans and/or cancer treatment goals with the patient.     Oncology History:    Oncology History   Malignant neoplasm of upper-outer quadrant of right breast in female, estrogen receptor negative (HCC)   7/29/2024 Biopsy    Right  breast stereotactic biopsy  10 o'clock (U clip)  Invasive ductal carcinoma  Grade 2  ER <1, CT <1, HER2 3+    Concordant. Malignancy appears unifocal; calcifications occupied approximately 1.8 cm. Right axilla US has not been performed. Left breast clear.     7/29/2024 -  Cancer Staged    Staging form: Breast, AJCC 8th Edition  - Clinical stage from 7/29/2024: Stage IA (cT1, cN0, cM0, G2, ER-, CT-, HER2+) - Signed by Keeley Carrillo MD on 8/7/2024  Stage prefix: Initial diagnosis  Method of lymph node assessment: Clinical  Histologic grading system: 3 grade system       8/7/2024 Genetic Testing    NEGATIVE: No Clinically Significant Variants Detected  A total of 59 genes were evaluated with RNA insight: APC, JOAQUÍN, BAP1, BARD1, BMPR1A, BRCA1, BRCA2, BRIP1, CDH1, CDK4, CDKN1B, CDKN2A, CHEK2, DICER1, FH, FLCN, KIF1B, MAX, MEN1, MET, MLH1, MSH2, MSH6, MUTYH, NF1, NTHL1, PALB2, PMS2, POT1, PTEN, RAD51C, RAD51D, RB1, RET, SDHA, SDHAF2, SDHB, SDHC, SDHD, SMAD4, SMARCA4, STK11, AOPN802, TP53, TSC1, TSC2 and VHL (sequencing and deletion/duplication); AXIN2, CTNNA1, EGLN1, HOXB13, KIT, MITF, MSH3, PDGFRA, POLD1 and POLE (sequencing only); EPCAM and GREM1 (deletion/duplication only).  Amb[place]         History of Present Illness: Patient is here to discuss her surgical management further  -Interval History: States that she started insulin and her blood sugars have been improved    Review of Systems:  Review of Systems   Constitutional: Negative.  Negative for appetite change, fever and unexpected weight change.   HENT: Negative.  Negative for trouble swallowing.    Eyes: Negative.    Respiratory: Negative.  Negative for cough and shortness of breath.    Cardiovascular: Negative.  Negative for chest pain.   Gastrointestinal: Negative.  Negative for abdominal pain, nausea and vomiting.   Endocrine: Negative.    Genitourinary: Negative.  Negative for dysuria.   Musculoskeletal: Negative.  Negative for arthralgias and myalgias.   Skin:  Negative.    Allergic/Immunologic: Negative.    Neurological: Negative.  Negative for headaches.   Hematological: Negative.  Negative for adenopathy. Does not bruise/bleed easily.   Psychiatric/Behavioral: Negative.         Patient Active Problem List   Diagnosis    Hepatic steatosis    History of uterine fibroid    Chronic cough    Weight gain    Type 2 diabetes mellitus with hyperglycemia, without long-term current use of insulin (HCC)    Dizziness    Mixed hyperlipidemia    Malignant neoplasm of upper-outer quadrant of right breast in female, estrogen receptor negative (HCC)    BRCA gene mutation negative     Past Medical History:   Diagnosis Date    Breast cancer (HCC)     right    Diabetes mellitus (HCC)     Hyperlipidemia      Past Surgical History:   Procedure Laterality Date    BREAST BIOPSY Right 2024    Stereotactic breast biopsy     SECTION      KNEE SURGERY Right     3 surgeries    MAMMO STEREOTACTIC BREAST BIOPSY RIGHT (ALL INC) Right 2024    TUBAL LIGATION       Family History   Problem Relation Age of Onset    Diabetes Mother     Diabetes Father     No Known Problems Sister     No Known Problems Sister     Leukemia Brother     No Known Problems Daughter     No Known Problems Daughter     No Known Problems Maternal Aunt     No Known Problems Maternal Aunt     No Known Problems Maternal Aunt     No Known Problems Maternal Aunt     No Known Problems Maternal Aunt     Throat cancer Maternal Uncle     No Known Problems Paternal Aunt     No Known Problems Paternal Aunt     No Known Problems Paternal Aunt     No Known Problems Paternal Aunt     No Known Problems Paternal Aunt     No Known Problems Maternal Grandmother     Diabetes Maternal Grandfather     Diabetes Paternal Grandmother     Cancer Paternal Grandfather         brain cancer?    Hodgkin's lymphoma Cousin      Social History     Socioeconomic History    Marital status: Single     Spouse name: Not on file    Number of children:  Not on file    Years of education: Not on file    Highest education level: Not on file   Occupational History    Not on file   Tobacco Use    Smoking status: Never    Smokeless tobacco: Never   Vaping Use    Vaping status: Never Used   Substance and Sexual Activity    Alcohol use: Yes     Comment: Occasionally    Drug use: Not Currently    Sexual activity: Yes     Partners: Male   Other Topics Concern    Not on file   Social History Narrative    Not on file     Social Determinants of Health     Financial Resource Strain: Not on file   Food Insecurity: Not on file   Transportation Needs: Not on file   Physical Activity: Not on file   Stress: Not on file   Social Connections: Not on file   Intimate Partner Violence: Not on file   Housing Stability: Not on file       Current Outpatient Medications:     atorvastatin (LIPITOR) 20 mg tablet, Take 1 tablet (20 mg total) by mouth every evening, Disp: 30 tablet, Rfl: 11    Blood Glucose Monitoring Suppl (OneTouch Verio Reflect) w/Device KIT, Check blood sugars once daily. Please substitute with appropriate alternative as covered by patient's insurance. Dx: E11.65, Disp: 1 kit, Rfl: 0    glucose blood (OneTouch Verio) test strip, Check blood sugars 3 times daily. Patient now on insulin. Please substitute with appropriate alternative as covered by patient's insurance. Dx: E11.65, Disp: 300 each, Rfl: 3    insulin degludec (TRESIBA) 100 units/mL injection pen, Inject 10 Units under the skin daily, Disp: 15 mL, Rfl: 1    Insulin Pen Needle (BD Pen Needle Clarisse U/F) 32G X 4 MM MISC, Use daily as directed with insulin pen, Disp: 100 each, Rfl: 3    meclizine (ANTIVERT) 25 mg tablet, Take 1 tablet (25 mg total) by mouth 3 (three) times a day as needed for dizziness, Disp: 30 tablet, Rfl: 0    metFORMIN (GLUCOPHAGE-XR) 500 mg 24 hr tablet, Take 1 tablet (500 mg total) by mouth daily with dinner, Disp: 30 tablet, Rfl: 11    OneTouch Delica Lancets 33G MISC, Check blood sugars three  times daily. Please substitute with appropriate alternative as covered by patient's insurance. Dx: E11.65, Disp: 300 each, Rfl: 3    traMADol (Ultram) 50 mg tablet, Take 1 tablet (50 mg total) by mouth every 8 (eight) hours as needed for moderate pain, Disp: 9 tablet, Rfl: 0  No current facility-administered medications for this visit.  No Known Allergies    The following portions of the patient's history were reviewed and updated as appropriate: allergies, current medications, past family history, past medical history, past social history, past surgical history, and problem list.        Vitals:    08/19/24 0949   BP: 122/76   Pulse: 72   Resp: 18   Temp: 97.7 °F (36.5 °C)       Physical Exam  Constitutional:       General: She is not in acute distress.     Appearance: Normal appearance.   Chest:      Comments: Moderate ptosis bilateral  Musculoskeletal:      Right lower leg: No edema.      Left lower leg: No edema.   Neurological:      Mental Status: She is alert and oriented to person, place, and time.   Psychiatric:         Mood and Affect: Mood normal.           Results:  Labs:  Genetic testing is negative    8/13/2024 hemoglobin A1c 8.9    Imaging  Patient declined LEOBARDO insertion as she decided to proceed with bilateral mastectomy    I reviewed the above laboratory and imaging data.    Discussion/Summary: 43-year-old female with newly diagnosed carcinoma of the right breast.  She initially opted for breast conservation.  Her preoperative hemoglobin A1c was 8.9.  I reached out to her PCP who started the patient on insulin.  We were awaiting blood sugar logs to be reviewed today to make decisions about proceeding with breast conservation this coming Friday.  The patient however has decided to proceed with bilateral mastectomy.  She understands that the medical management will be on changed.  She will not however likely need any radiation therapy.  She is interested in reconstruction but understands this may not  be offered now secondary to her uncontrolled diabetes.  I will refer her to plastics.  We signed consent today for a right mastectomy with lymphatic mapping and sentinel node biopsy with possible axillary dissection along with a left prophylactic mastectomy.  I will await plastic surgery input as well as clearance from her PCP regarding the diabetes.

## 2024-08-22 ENCOUNTER — OFFICE VISIT (OUTPATIENT)
Dept: PLASTIC SURGERY | Facility: CLINIC | Age: 44
End: 2024-08-22
Payer: COMMERCIAL

## 2024-08-22 VITALS
WEIGHT: 186.25 LBS | TEMPERATURE: 98.2 F | DIASTOLIC BLOOD PRESSURE: 91 MMHG | BODY MASS INDEX: 31.03 KG/M2 | HEIGHT: 65 IN | SYSTOLIC BLOOD PRESSURE: 148 MMHG | HEART RATE: 90 BPM

## 2024-08-22 DIAGNOSIS — C50.411 MALIGNANT NEOPLASM OF UPPER-OUTER QUADRANT OF RIGHT BREAST IN FEMALE, ESTROGEN RECEPTOR NEGATIVE (HCC): ICD-10-CM

## 2024-08-22 DIAGNOSIS — Z17.1 MALIGNANT NEOPLASM OF UPPER-OUTER QUADRANT OF RIGHT BREAST IN FEMALE, ESTROGEN RECEPTOR NEGATIVE (HCC): ICD-10-CM

## 2024-08-22 PROCEDURE — 99245 OFF/OP CONSLTJ NEW/EST HI 55: CPT | Performed by: STUDENT IN AN ORGANIZED HEALTH CARE EDUCATION/TRAINING PROGRAM

## 2024-08-22 NOTE — PROGRESS NOTES
Plastic Surgery Consult    Reason for visit: right breast cancer, presents for discussion of reconstructive options    HPI from 24  Patient is a pleasant 44 y/o female who presents with newly diagnosed right breast cancer and presents for reconstructive options. Patient is planing for bilateral mastectomies, left side prophylactic. The main concern is that she is a poorly controlled diabetic with last HgbA1C of 8.9 on . She has recently started on a higher insulin regimen with her PCP.    Patient states that she is small breasted and would like to be slightly larger than she is currently upon completion after reconstruction.    ROS: 12 pt ROS negative, except as otherwise noted in HPI    PMH: diabetes  Family History   Problem Relation Age of Onset    Diabetes Mother     Diabetes Father     No Known Problems Sister     No Known Problems Sister     Leukemia Brother     No Known Problems Daughter     No Known Problems Daughter     No Known Problems Maternal Aunt     No Known Problems Maternal Aunt     No Known Problems Maternal Aunt     No Known Problems Maternal Aunt     No Known Problems Maternal Aunt     Throat cancer Maternal Uncle     No Known Problems Paternal Aunt     No Known Problems Paternal Aunt     No Known Problems Paternal Aunt     No Known Problems Paternal Aunt     No Known Problems Paternal Aunt     No Known Problems Maternal Grandmother     Diabetes Maternal Grandfather     Diabetes Paternal Grandmother     Cancer Paternal Grandfather         brain cancer?    Hodgkin's lymphoma Cousin        SurgHx: knee surgeries,  x2  SocHx: no tobacco, +social ETOH  Meds: no blood thinners, no steroids  Allergies: NKDA    PE:    Vitals:    24 0833   BP: 148/91   Pulse: 90   Temp: 98.2 °F (36.8 °C)       General: NC/AT, breathing comfortably on RA  Neuro: CN II-XII grossly intact, symmetric reflexes  HEENT: PERRLA, EOMI, external ears normal, no lesions or deformities, neck supple, trachea  midline  Respiratory: CTAB, normal respiratory effort  Cardio: RRR, normal S1, S2, no murmur, rubs, gallops  GI: soft, non-tender, non-distended  Extremities/MSK: normal alignment, mobility, gait, no edema  Skin: no rashes, lesions, subcutaneous nodules    BMI 31.5  Bilateral small breasts with no ptosis  Normal nipple sensation bilaterally    Breast measurements:    R  L  SN-N  24 cm  24 cm  N-IMF  9 cm  8.5 cm  BW  14-15 cm 14-15 cm    Minimal abdominal tissue donor site, mostly skin.    A/P: 42 y/o female with right breast cancer with planned bilateral mastectomies, left side prophylactic who presents for discussion of reconstructive options.    A detailed conversation was had regarding the patient’s options for breast reconstruction.  Five main points, which are explained to all breast reconstruction patients, were discussed.     Breast reconstruction is an optional process.  In addition, breast reconstruction can be performed in an immediate and delayed fashion.  Even if a patient does not opt for reconstruction now, it can performed at a later time.  Breast reconstruction is a multi-stage process which involves multiple surgeries spaced several months apart.  The entire process can take over one year.  The patient can stop within this process and/or resume it again at any time.  The major goal of breast reconstruction is to have the patient look normal in clothing.  When naked, there will always be scars and other stigmata of the breast reconstruction process.  Asymmetries are often present during the reconstruction process.  Several operations may be needed, including surgery to the non-cancerous breast, to achieve satisfactory results.  No matter the reconstructive method, there are ways that the reconstruction can fail and a secondary reconstructive plan would need to be created.     Next, a general discussion regarding all available methods of breast reconstruction were discussed including tissue  expander-implant based reconstruction and autologous reconstruction. I also discussed the impact of radiation therapy and chemotherapy on reconstructive options should she need it.    Patient is not a good candidate for SUSY flap because of her minimal abdominal tissue donor site.     For each of the reconstruction methods mentioned above, the risks, benefits, alternatives, scarring, and recovery time were discussed in great detail.  Specific risks detailed included bleeding, infection, hematoma, seroma, scarring, pain, wound healing complications, flap loss, fat necrosis, capsular contracture, need for implant removal, donor site complications, bulge, hernia, umbilical necrosis, need for urgent reoperation, and need for dressing changes were discussed.     I discussed with the patient that I will discuss with her PCP regarding her DM treatment. As of right now, her HgbA1C of 8.9 is too high for immediate reconstruction due to the high risk of infection and implant failure. I discussed with the patient that reconstruction can be performed in a delayed fashion and that the expanders can be placed at a later date once her DM is better controlled. The only caveat is that if she requires radiation, she will be unable to have the expanders placed and will likely require a latissimus muscle flap. After my discussion with patient's PCP, I will contact patient regarding further planning, if only for aesthetic flat closure. Patient acknowledged. As of right now, the patient is not a good candidate for immediate reconstruction with tissue expanders due to her elevated HgbA1C of 8.9.    -Spent 60 minutes in consultation with patient. Greater than 50% of the total time was spent obtaining history, evaluation, performing exam, discussion of management options including post-operative care, answering patient's questions and concerns, chart reviewing, and documentation    Jj Cardoza MD   [facility]astic [facility]  [facility]   [address], [address]   [place] [address]   Office: [phone]

## 2024-08-26 ENCOUNTER — TELEPHONE (OUTPATIENT)
Dept: FAMILY MEDICINE CLINIC | Facility: CLINIC | Age: 44
End: 2024-08-26

## 2024-08-26 DIAGNOSIS — E11.65 TYPE 2 DIABETES MELLITUS WITH HYPERGLYCEMIA, WITHOUT LONG-TERM CURRENT USE OF INSULIN (HCC): ICD-10-CM

## 2024-08-26 NOTE — TELEPHONE ENCOUNTER
Form has been completed as needed for pt, I have left an vm to advice pt, form has been scanned and faxed as well for pt.     Thank you.   Paz.

## 2024-08-26 NOTE — TELEPHONE ENCOUNTER
Patient came into office today in regards of forms being completed, one form for [facility]GI for pt, she states she will pick it up and doesn't need to be faxed.     Second form pt states she needs completed by today Aug 26,2024 before 4:30. I have advice pt that currently provider may not complete forms by today but will advice as needed.     Patient would like a call back in regards of completed forms.      Forms have been left on providers desk.    Please scan and advice as needed.     Thank you.   Paz

## 2024-08-26 NOTE — TELEPHONE ENCOUNTER
Prior auth not required on Metformin 500 MG Please process script from [facility] #[id number] form scanned into chart

## 2024-08-26 NOTE — TELEPHONE ENCOUNTER
Patient came back into office to collect forms, pt soon stated that when she called [facility] to [facility] if forms have been received, they stated they have received it but can not take forms as it was sign by a Physician Assistance and would need an MD instead to sign.     Patient has brought in new form to be completed will be provider ing form to MD to see if form may be signed and faxed again for pt.     Please fax to [phone] as needed and scan in new form as well advising pt once completed.     Thank you.   Paz.

## 2024-08-27 ENCOUNTER — TELEPHONE (OUTPATIENT)
Dept: FAMILY MEDICINE CLINIC | Facility: CLINIC | Age: 44
End: 2024-08-27

## 2024-08-27 NOTE — TELEPHONE ENCOUNTER
Called pt and left an vm that form has been faxed and scanned into chart, pt may collect form at .     It has been resigned and signed by MD Luis Pérez and [place] Cates.     Thank you.  Paz

## 2024-08-28 ENCOUNTER — TELEPHONE (OUTPATIENT)
Dept: PLASTIC SURGERY | Facility: CLINIC | Age: 44
End: 2024-08-28

## 2024-08-28 NOTE — TELEPHONE ENCOUNTER
LVM for patient to call me and set up preop and other appts with Dr. Cardoza.   Asked to please call me at my direct number.

## 2024-08-29 ENCOUNTER — TELEPHONE (OUTPATIENT)
Dept: FAMILY MEDICINE CLINIC | Facility: CLINIC | Age: 44
End: 2024-08-29

## 2024-08-29 NOTE — TELEPHONE ENCOUNTER
----- Message from Keeley Carrillo MD sent at 8/29/2024  4:32 PM EDT -----  Ok thanks  ----- Message -----  From: Sylwia Cates PA-C  Sent: 8/29/2024  10:55 AM EDT  To: Karla Colmenares MA; Keeley Carrillo MD; #    Pt has reported her glucose readings for the past four days and her average is now 133 which would calculate out to an A1C of roughly 6.5 on her current 20 units of long acting insulin daily.  ----- Message -----  From: Keeley Carrillo MD  Sent: 8/26/2024   4:37 PM EDT  To: Karla Colmenares MA; Sylwia Cates PA-C; #    Ok thank you. So I will let this decision up to Dr. Cardoza if he is comfortable proceeding with reconstruction at the time of the mastectomies or just the flat closure.   Emmanuel please let me know what you think so we can get her appropriately scheduled. Thanks.  ----- Message -----  From: Sylwia Cates PA-C  Sent: 8/26/2024   2:00 PM EDT  To: Krala Colmenares MA; Keeley Carrillo MD; #    I did speak with Dr. Cardoza last week. He prefers A1C under 8 for his part of the reconstruction surgery. Pts last A1C was 2 weeks ago and it takes 30 days to reflect overall changes even if they are improving. Therefore I am trying to safety titrate pt up on her new Tresiba basal insulin. We increased her to 15 units before the weekend and this am she sent me her glucose readings for the past four days. They average out to give her a calculated A1C between 7.5 and 7 at an average of 160. I have instructed her to increase to 20 units and report her readings to me on Wed. I do believe this will put her in the calculated range of 7 A1C by the end of this week. This should place her in a spot to possibly be able to do both procedures at once as determined by Dr. Cardoza.  ----- Message -----  From: Keeley Carrillo MD  Sent: 8/26/2024  12:22 PM EDT  To: Karla Colmenares MA; Sylwia Cates PA-C; #    Hello. This pt's lumpectomy was cancelled on Friday because she has decided to proceed with bilateral  mastectomy instead. She saw Dr. Cardoza for a consult who did not offer immediate reconstruction secondary to the uncontrolled diabetes and would offer aesthetic flat closure only and delayed reconstruction. She is sending a message to see if we got any follow up from you. We currently do not have a surgery date for her but are in the process of doing so. Any updates on your end as far as her diabetic management. Thanks.

## 2024-08-30 ENCOUNTER — PATIENT OUTREACH (OUTPATIENT)
Dept: CASE MANAGEMENT | Facility: HOSPITAL | Age: 44
End: 2024-08-30

## 2024-08-30 ENCOUNTER — OFFICE VISIT (OUTPATIENT)
Dept: FAMILY MEDICINE CLINIC | Facility: CLINIC | Age: 44
End: 2024-08-30
Payer: COMMERCIAL

## 2024-08-30 VITALS
DIASTOLIC BLOOD PRESSURE: 76 MMHG | BODY MASS INDEX: 30.89 KG/M2 | WEIGHT: 185.4 LBS | HEART RATE: 70 BPM | SYSTOLIC BLOOD PRESSURE: 112 MMHG | HEIGHT: 65 IN

## 2024-08-30 DIAGNOSIS — R63.5 WEIGHT GAIN: ICD-10-CM

## 2024-08-30 DIAGNOSIS — E11.65 TYPE 2 DIABETES MELLITUS WITH HYPERGLYCEMIA, WITH LONG-TERM CURRENT USE OF INSULIN (HCC): Primary | ICD-10-CM

## 2024-08-30 DIAGNOSIS — E78.2 MIXED HYPERLIPIDEMIA: ICD-10-CM

## 2024-08-30 DIAGNOSIS — C50.411 MALIGNANT NEOPLASM OF UPPER-OUTER QUADRANT OF RIGHT BREAST IN FEMALE, ESTROGEN RECEPTOR NEGATIVE (HCC): ICD-10-CM

## 2024-08-30 DIAGNOSIS — Z17.1 MALIGNANT NEOPLASM OF UPPER-OUTER QUADRANT OF RIGHT BREAST IN FEMALE, ESTROGEN RECEPTOR NEGATIVE (HCC): ICD-10-CM

## 2024-08-30 DIAGNOSIS — Z79.4 TYPE 2 DIABETES MELLITUS WITH HYPERGLYCEMIA, WITH LONG-TERM CURRENT USE OF INSULIN (HCC): Primary | ICD-10-CM

## 2024-08-30 PROBLEM — R42 DIZZINESS: Status: RESOLVED | Noted: 2024-05-22 | Resolved: 2024-08-30

## 2024-08-30 PROCEDURE — 99214 OFFICE O/P EST MOD 30 MIN: CPT | Performed by: PHYSICIAN ASSISTANT

## 2024-08-30 NOTE — PATIENT INSTRUCTIONS
1. Type 2 diabetes mellitus with hyperglycemia, with long-term current use of insulin (HCC)  Assessment & Plan:  This is a new diagnosis.  Patient was started on metformin with orders for diabetic education classes however patient was then diagnosed with breast cancer.  We have been working to bring her A1c down as quickly as possible and did need to initiate basal insulin.  Patient has been titrated to since last appointment to 20 units daily and currently her sugars are ranging an average of 133 which calculates out to an A1c of roughly 6.5.  Her surgeon has been informed and hopefully her surgery can be scheduled and completed.  No changes at this time recheck blood work in 3 months.  Patient has lost 10 pounds since diagnosis and if she continues on this pathway we may need want to back off of her Tresiba if frequent hypoglycemia starts occurring.  Lab Results   Component Value Date    HGBA1C 8.9 (H) 08/13/2024     Orders:  -     Comprehensive metabolic panel; Future; Expected date: 11/30/2024  -     Hemoglobin A1C; Future; Expected date: 11/30/2024  2. Mixed hyperlipidemia  Assessment & Plan:  Patient is on Lipitor 20 with LDL currently at 53 no change recheck with next labs.  Orders:  -     Comprehensive metabolic panel; Future; Expected date: 11/30/2024  -     Lipid Panel with Direct LDL reflex; Future; Expected date: 11/30/2024  3. Weight gain  Assessment & Plan:  Patient has lost 10 pounds recently.    4. Malignant neoplasm of upper-outer quadrant of right breast in female, estrogen receptor negative (HCC)  Assessment & Plan:  Pt. has surgery planned for Oct for dorie mastectomy and expander placement.

## 2024-08-30 NOTE — ASSESSMENT & PLAN NOTE
This is a new diagnosis.  Patient was started on metformin with orders for diabetic education classes however patient was then diagnosed with breast cancer.  We have been working to bring her A1c down as quickly as possible and did need to initiate basal insulin.  Patient has been titrated to since last appointment to 20 units daily and currently her sugars are ranging an average of 133 which calculates out to an A1c of roughly 6.5.  Her surgeon has been informed and hopefully her surgery can be scheduled and completed.  No changes at this time recheck blood work in 3 months.  Patient has lost 10 pounds since diagnosis and if she continues on this pathway we may need want to back off of her Tresiba if frequent hypoglycemia starts occurring.  Lab Results   Component Value Date    HGBA1C 8.9 (H) 08/13/2024

## 2024-08-30 NOTE — PROGRESS NOTES
Ambulatory Visit  Name: Saloni Malone      : 1980      MRN: [id number]  Encounter Provider: Sylwia Cates PA-C  Encounter Date: 2024   Encounter department: [facility] PRIMARY CARE    Assessment & Plan   1. Type 2 diabetes mellitus with hyperglycemia, with long-term current use of insulin (HCC)  Assessment & Plan:  This is a new diagnosis.  Patient was started on metformin with orders for diabetic education classes however patient was then diagnosed with breast cancer.  We have been working to bring her A1c down as quickly as possible and did need to initiate basal insulin.  Patient has been titrated to since last appointment to 20 units daily and currently her sugars are ranging an average of 133 which calculates out to an A1c of roughly 6.5.  Her surgeon has been informed and hopefully her surgery can be scheduled and completed.  No changes at this time recheck blood work in 3 months.  Patient has lost 10 pounds since diagnosis and if she continues on this pathway we may need want to back off of her Tresiba if frequent hypoglycemia starts occurring.  Lab Results   Component Value Date    HGBA1C 8.9 (H) 2024     Orders:  -     Comprehensive metabolic panel; Future; Expected date: 2024  -     Hemoglobin A1C; Future; Expected date: 2024  2. Mixed hyperlipidemia  Assessment & Plan:  Patient is on Lipitor 20 with LDL currently at 53 no change recheck with next labs.  Orders:  -     Comprehensive metabolic panel; Future; Expected date: 2024  -     Lipid Panel with Direct LDL reflex; Future; Expected date: 2024  3. Weight gain  Assessment & Plan:  Patient has lost 10 pounds recently.    4. Malignant neoplasm of upper-outer quadrant of right breast in female, estrogen receptor negative (HCC)  Assessment & Plan:  Pt. has surgery planned for Oct for dorie mastectomy and expander placement.      History of Present Illness       Saloni Malone is here for  chronic conditions f/u. Pt. had labs done prior to today's visit which included Recent Results (from the past 672 hour(s))  -MISCELLANEOUS LAB TEST:   Collection Time: 08/07/24 12:16 PM       Result                      Value             Ref Range           Miscellaneous Lab Test*                                      -Hemoglobin A1C:   Collection Time: 08/13/24  7:18 AM       Result                      Value             Ref Range           Hemoglobin A1C              8.9 (H)           Normal 4.0-5*       EAG                         209               mg/dl          -Comprehensive metabolic panel:   Collection Time: 08/13/24  7:18 AM       Result                      Value             Ref Range           Sodium                      137               135 - 147 mm*       Potassium                   4.1               3.5 - 5.3 mm*       Chloride                    103               96 - 108 mmo*       CO2                         29                21 - 32 mmol*       ANION GAP                   5                 4 - 13 mmol/L       BUN                         10                5 - 25 mg/dL        Creatinine                  0.48 (L)          0.60 - 1.30 *       Glucose, Fasting            177 (H)           65 - 99 mg/dL       Calcium                     8.8               8.4 - 10.2 m*       AST                         26                13 - 39 U/L         ALT                         30                7 - 52 U/L          Alkaline Phosphatase        96                34 - 104 U/L        Total Protein               7.4               6.4 - 8.4 g/*       Albumin                     3.8               3.5 - 5.0 g/*       Total Bilirubin             0.49              0.20 - 1.00 *       eGFR                        118               ml/min/1.73s*  -Lipid Panel with Direct LDL reflex:   Collection Time: 08/13/24  7:18 AM       Result                      Value             Ref Range           Cholesterol                 112                See Comment *       Triglycerides               121               See Comment *       HDL, Direct                 35 (L)            >=50 mg/dL          LDL Calculated              53                0 - 100 mg/dL  -UA w Reflex to Microscopic w Reflex to Culture:   Collection Time: 08/13/24  7:18 AM  Specimen: Urine, Clean Catch       Result                      Value             Ref Range           Color, UA                   Yellow                                Clarity, UA                 Turbid                                Specific [place], UA        1.025             1.003 - 1.030       pH, UA                      5.5               4.5, 5.0, 5.*       Leukocytes, UA              Trace (A)         Negative            Nitrite, UA                 Negative          Negative            Protein, UA                 Negative          Negative mg/*       Glucose, UA                                   Negative mg/*   70 (7/100%) (A)       Ketones, UA                 Negative          Negative mg/*       Urobilinogen, UA            <2.0              <2.0 mg/dl m*       Bilirubin, UA               Negative          Negative            Occult Blood, UA            Small (A)         Negative       -CBC and differential:   Collection Time: 08/13/24  7:18 AM       Result                      Value             Ref Range           WBC                         8.52              4.31 - 10.16*       RBC                         4.50              3.81 - 5.12 *       Hemoglobin                  12.6              11.5 - 15.4 *       Hematocrit                  38.2              34.8 - 46.1 %       MCV                         85                82 - 98 fL          MCH                         28.0              26.8 - 34.3 *       MCHC                        33.0              31.4 - 37.4 *       RDW                         12.6              11.6 - 15.1 %       MPV                         11.8              8.9 - 12.7 fL       Platelets    "                270               149 - 390 Th*       nRBC                        0                 /100 WBCs           Segmented %                 61                43 - 75 %           Immature Grans %            0                 0 - 2 %             Lymphocytes %               33                14 - 44 %           Monocytes %                 5                 4 - 12 %            Eosinophils Relative        1                 0 - 6 %             Basophils Relative          0                 0 - 1 %             Absolute Neutrophils        5.13              1.85 - 7.62 *       Absolute Immature Grans     0.03              0.00 - 0.20 *       Absolute Lymphocytes        2.83              0.60 - 4.47 *       Absolute Monocytes          0.42              0.17 - 1.22 *       Eosinophils Absolute        0.10              0.00 - 0.61 *       Basophils Absolute          0.01              0.00 - 0.10 *  -Urine Microscopic:   Collection Time: 08/13/24  7:18 AM       Result                      Value             Ref Range           RBC, UA                     2-4 (A)           None Seen, 1*       WBC, UA                     2-4 (A)           None Seen, 1*       Epithelial Cells            Occasional        None Seen, O*       Bacteria, UA                Occasional        None Seen, O*       MUCUS THREADS               Moderate (A)      None Seen        -        Review of Systems   Constitutional: Negative.    HENT: Negative.     Eyes: Negative.    Respiratory: Negative.     Cardiovascular: Negative.    Gastrointestinal: Negative.    Endocrine: Negative.    Genitourinary: Negative.    Musculoskeletal: Negative.    Skin: Negative.    Allergic/Immunologic: Negative.    Neurological: Negative.    Hematological: Negative.    Psychiatric/Behavioral: Negative.         Objective     /76 (BP Location: Right arm, Patient Position: Sitting, Cuff Size: Standard)   Pulse 70   Ht 5' 4.5\" (1.638 m)   Wt 84.1 kg (185 lb 6.4 oz)   LMP " 08/01/2024 (Approximate)   BMI 31.33 kg/m²     Physical Exam  Vitals and nursing note reviewed.   Constitutional:       Appearance: Normal appearance. She is well-developed.   HENT:      Head: Normocephalic and atraumatic.   Eyes:      General: Lids are normal.      Conjunctiva/sclera: Conjunctivae normal.      Pupils: Pupils are equal, round, and reactive to light.   Cardiovascular:      Rate and Rhythm: Normal rate and regular rhythm.      Heart sounds: No murmur heard.  Pulmonary:      Effort: Pulmonary effort is normal.      Breath sounds: Normal breath sounds.   Skin:     General: Skin is warm and dry.   Neurological:      General: No focal deficit present.      Mental Status: She is alert.      Coordination: Coordination is intact.   Psychiatric:         Mood and Affect: Mood normal.         Behavior: Behavior normal. Behavior is cooperative.         Thought Content: Thought content normal.         Judgment: Judgment normal.       Administrative Statements

## 2024-09-04 ENCOUNTER — OFFICE VISIT (OUTPATIENT)
Dept: PLASTIC SURGERY | Facility: CLINIC | Age: 44
End: 2024-09-04
Payer: COMMERCIAL

## 2024-09-04 ENCOUNTER — PATIENT OUTREACH (OUTPATIENT)
Dept: CASE MANAGEMENT | Facility: HOSPITAL | Age: 44
End: 2024-09-04

## 2024-09-04 VITALS
HEART RATE: 71 BPM | DIASTOLIC BLOOD PRESSURE: 76 MMHG | TEMPERATURE: 98.8 F | WEIGHT: 185 LBS | BODY MASS INDEX: 30.82 KG/M2 | SYSTOLIC BLOOD PRESSURE: 120 MMHG | HEIGHT: 65 IN

## 2024-09-04 DIAGNOSIS — C50.411 MALIGNANT NEOPLASM OF UPPER-OUTER QUADRANT OF RIGHT BREAST IN FEMALE, ESTROGEN RECEPTOR NEGATIVE (HCC): Primary | ICD-10-CM

## 2024-09-04 DIAGNOSIS — Z17.1 MALIGNANT NEOPLASM OF UPPER-OUTER QUADRANT OF RIGHT BREAST IN FEMALE, ESTROGEN RECEPTOR NEGATIVE (HCC): Primary | ICD-10-CM

## 2024-09-04 PROCEDURE — 99215 OFFICE O/P EST HI 40 MIN: CPT | Performed by: STUDENT IN AN ORGANIZED HEALTH CARE EDUCATION/TRAINING PROGRAM

## 2024-09-04 NOTE — PROGRESS NOTES
Plastic Surgery Consult     Reason for visit: right breast cancer, presents for further discussion of reconstructive options     HPI from 9/4/24  Patient presents for further discussion of reconstructive options.    I had discussed with her PCP and Dr Carrillo and have decided to delay any reconstruction given her poor glycemic control evidenced by her last HgbA1C of 8.9 on 8/13. She has been working diligently to improve her glycemic control. I discussed that even with only closure she is at increased risk of infection, seromas, and other complications. Patient acknowledged.    BMI 31.5  Bilateral small breasts with no ptosis  Normal nipple sensation bilaterally     Breast measurements:                          R                      L  SN-N               24 cm              24 cm  N-IMF              10 cm             10 cm  BW                  15.5 cm  15.5 cm     Minimal abdominal tissue donor site, mostly skin.    I informed patient that if she does require radiation, that it could complicate reconstruction, particularly with expander/implant reconstruction. Patient acknowledged.    I did discuss with patient the possibility of nipple sparing mastectomy as patient has outer quadrant lesion at the 10'o clock position, 7 cm from the nipple. Patient has no ptosis and is small-breasted and she would be a good candidate for nipple sparing mastectomy. In the future, if she does not require radiation, she would potentially be a good candidate for direct to implant recon.I discussed this possibility with Dr Carrillo and she will contact the patient.     I informed the patient that I will perform the closure of the mastectomy.  I discussed post-operative care, including the importance of compression and drain management.  All questions answered, concerns addressed.  Consent obtained  Will order exparel  -Spent 45 minutes in consultation with patient. Greater than 50% of the total time was spent obtaining history, evaluation,  performing exam, discussion of management options including post-operative care, answering patient's questions and concerns, chart reviewing, and documentation    Jj Cardoza MD   [facility]   [address], [address]   [place] [address]   Office: [phone]      HPI from 24  Patient is a pleasant 44 y/o female who presents with newly diagnosed right breast cancer and presents for reconstructive options. Patient is planing for bilateral mastectomies, left side prophylactic. The main concern is that she is a poorly controlled diabetic with last HgbA1C of 8.9 on . She has recently started on a higher insulin regimen with her PCP.     Patient states that she is small breasted and would like to be slightly larger than she is currently upon completion after reconstruction.     ROS: 12 pt ROS negative, except as otherwise noted in HPI     PMH: diabetes  Family History         Family History   Problem Relation Age of Onset    Diabetes Mother      Diabetes Father      No Known Problems Sister      No Known Problems Sister      Leukemia Brother      No Known Problems Daughter      No Known Problems Daughter      No Known Problems Maternal Aunt      No Known Problems Maternal Aunt      No Known Problems Maternal Aunt      No Known Problems Maternal Aunt      No Known Problems Maternal Aunt      Throat cancer Maternal Uncle      No Known Problems Paternal Aunt      No Known Problems Paternal Aunt      No Known Problems Paternal Aunt      No Known Problems Paternal Aunt      No Known Problems Paternal Aunt      No Known Problems Maternal Grandmother      Diabetes Maternal Grandfather      Diabetes Paternal Grandmother      Cancer Paternal Grandfather           brain cancer?    Hodgkin's lymphoma Cousin              SurgHx: knee surgeries,  x2  SocHx: no tobacco, +social ETOH  Meds: no blood thinners, no steroids  Allergies: NKDA     PE:         Vitals:      08/22/24 0833   BP: 148/91   Pulse: 90   Temp: 98.2 °F (36.8 °C)         General: NC/AT, breathing comfortably on RA  Neuro: CN II-XII grossly intact, symmetric reflexes  HEENT: PERRLA, EOMI, external ears normal, no lesions or deformities, neck supple, trachea midline  Respiratory: CTAB, normal respiratory effort  Cardio: RRR, normal S1, S2, no murmur, rubs, gallops  GI: soft, non-tender, non-distended  Extremities/MSK: normal alignment, mobility, gait, no edema  Skin: no rashes, lesions, subcutaneous nodules     BMI 31.5  Bilateral small breasts with no ptosis  Normal nipple sensation bilaterally     Breast measurements:                          R                      L  SN-N               24 cm              24 cm  N-IMF              9 cm                8.5 cm  BW                  14-15 cm         14-15 cm     Minimal abdominal tissue donor site, mostly skin.     A/P: 42 y/o female with right breast cancer with planned bilateral mastectomies, left side prophylactic who presents for discussion of reconstructive options.     A detailed conversation was had regarding the patient’s options for breast reconstruction.  Five main points, which are explained to all breast reconstruction patients, were discussed.     Breast reconstruction is an optional process.  In addition, breast reconstruction can be performed in an immediate and delayed fashion.  Even if a patient does not opt for reconstruction now, it can performed at a later time.  Breast reconstruction is a multi-stage process which involves multiple surgeries spaced several months apart.  The entire process can take over one year.  The patient can stop within this process and/or resume it again at any time.  The major goal of breast reconstruction is to have the patient look normal in clothing.  When naked, there will always be scars and other stigmata of the breast reconstruction process.  Asymmetries are often present during the reconstruction process.  Several  operations may be needed, including surgery to the non-cancerous breast, to achieve satisfactory results.  No matter the reconstructive method, there are ways that the reconstruction can fail and a secondary reconstructive plan would need to be created.     Next, a general discussion regarding all available methods of breast reconstruction were discussed including tissue expander-implant based reconstruction and autologous reconstruction. I also discussed the impact of radiation therapy and chemotherapy on reconstructive options should she need it.     Patient is not a good candidate for SUSY flap because of her minimal abdominal tissue donor site.     For each of the reconstruction methods mentioned above, the risks, benefits, alternatives, scarring, and recovery time were discussed in great detail.  Specific risks detailed included bleeding, infection, hematoma, seroma, scarring, pain, wound healing complications, flap loss, fat necrosis, capsular contracture, need for implant removal, donor site complications, bulge, hernia, umbilical necrosis, need for urgent reoperation, and need for dressing changes were discussed.     I discussed with the patient that I will discuss with her PCP regarding her DM treatment. As of right now, her HgbA1C of 8.9 is too high for immediate reconstruction due to the high risk of infection and implant failure. I discussed with the patient that reconstruction can be performed in a delayed fashion and that the expanders can be placed at a later date once her DM is better controlled. The only caveat is that if she requires radiation, she will be unable to have the expanders placed and will likely require a latissimus muscle flap. After my discussion with patient's PCP, I will contact patient regarding further planning, if only for aesthetic flat closure. Patient acknowledged. As of right now, the patient is not a good candidate for immediate reconstruction with tissue expanders due to  her elevated HgbA1C of 8.9.     -Spent 60 minutes in consultation with patient. Greater than 50% of the total time was spent obtaining history, evaluation, performing exam, discussion of management options including post-operative care, answering patient's questions and concerns, chart reviewing, and documentation     Jj Cardoza MD   [facility]   [address] [place] [address], [address]   [place] [address]   Office: [phone]